# Patient Record
Sex: FEMALE | Race: WHITE | NOT HISPANIC OR LATINO | URBAN - METROPOLITAN AREA
[De-identification: names, ages, dates, MRNs, and addresses within clinical notes are randomized per-mention and may not be internally consistent; named-entity substitution may affect disease eponyms.]

---

## 2017-04-24 ENCOUNTER — OUTPATIENT (OUTPATIENT)
Dept: OUTPATIENT SERVICES | Facility: HOSPITAL | Age: 72
LOS: 1 days | End: 2017-04-24
Payer: COMMERCIAL

## 2017-04-24 VITALS
OXYGEN SATURATION: 100 % | HEART RATE: 81 BPM | SYSTOLIC BLOOD PRESSURE: 126 MMHG | DIASTOLIC BLOOD PRESSURE: 57 MMHG | RESPIRATION RATE: 18 BRPM | TEMPERATURE: 98 F

## 2017-04-24 DIAGNOSIS — Z01.810 ENCOUNTER FOR PREPROCEDURAL CARDIOVASCULAR EXAMINATION: ICD-10-CM

## 2017-04-24 DIAGNOSIS — I34.0 NONRHEUMATIC MITRAL (VALVE) INSUFFICIENCY: ICD-10-CM

## 2017-04-24 LAB
ANION GAP SERPL CALC-SCNC: 12 MMOL/L — SIGNIFICANT CHANGE UP (ref 5–17)
BASOPHILS # BLD AUTO: 0 K/UL — SIGNIFICANT CHANGE UP (ref 0–0.2)
BASOPHILS NFR BLD AUTO: 0.3 % — SIGNIFICANT CHANGE UP (ref 0–2)
BUN SERPL-MCNC: 16 MG/DL — SIGNIFICANT CHANGE UP (ref 8–20)
CALCIUM SERPL-MCNC: 9.2 MG/DL — SIGNIFICANT CHANGE UP (ref 8.6–10.2)
CHLORIDE SERPL-SCNC: 103 MMOL/L — SIGNIFICANT CHANGE UP (ref 98–107)
CO2 SERPL-SCNC: 25 MMOL/L — SIGNIFICANT CHANGE UP (ref 22–29)
CREAT SERPL-MCNC: 0.54 MG/DL — SIGNIFICANT CHANGE UP (ref 0.5–1.3)
EOSINOPHIL # BLD AUTO: 0.2 K/UL — SIGNIFICANT CHANGE UP (ref 0–0.5)
EOSINOPHIL NFR BLD AUTO: 2.4 % — SIGNIFICANT CHANGE UP (ref 0–6)
GLUCOSE SERPL-MCNC: 88 MG/DL — SIGNIFICANT CHANGE UP (ref 70–115)
HCT VFR BLD CALC: 40.8 % — SIGNIFICANT CHANGE UP (ref 37–47)
HGB BLD-MCNC: 13.4 G/DL — SIGNIFICANT CHANGE UP (ref 12–16)
INR BLD: 0.96 RATIO — SIGNIFICANT CHANGE UP (ref 0.88–1.16)
LYMPHOCYTES # BLD AUTO: 1.9 K/UL — SIGNIFICANT CHANGE UP (ref 1–4.8)
LYMPHOCYTES # BLD AUTO: 30.1 % — SIGNIFICANT CHANGE UP (ref 20–55)
MCHC RBC-ENTMCNC: 30 PG — SIGNIFICANT CHANGE UP (ref 27–31)
MCHC RBC-ENTMCNC: 32.8 G/DL — SIGNIFICANT CHANGE UP (ref 32–36)
MCV RBC AUTO: 91.5 FL — SIGNIFICANT CHANGE UP (ref 81–99)
MONOCYTES # BLD AUTO: 0.5 K/UL — SIGNIFICANT CHANGE UP (ref 0–0.8)
MONOCYTES NFR BLD AUTO: 7.9 % — SIGNIFICANT CHANGE UP (ref 3–10)
NEUTROPHILS # BLD AUTO: 3.7 K/UL — SIGNIFICANT CHANGE UP (ref 1.8–8)
NEUTROPHILS NFR BLD AUTO: 59 % — SIGNIFICANT CHANGE UP (ref 37–73)
PLATELET # BLD AUTO: 222 K/UL — SIGNIFICANT CHANGE UP (ref 150–400)
POTASSIUM SERPL-MCNC: 3.9 MMOL/L — SIGNIFICANT CHANGE UP (ref 3.5–5.3)
POTASSIUM SERPL-SCNC: 3.9 MMOL/L — SIGNIFICANT CHANGE UP (ref 3.5–5.3)
PROTHROM AB SERPL-ACNC: 10.5 SEC — SIGNIFICANT CHANGE UP (ref 9.8–12.7)
RBC # BLD: 4.46 M/UL — SIGNIFICANT CHANGE UP (ref 4.4–5.2)
RBC # FLD: 13.4 % — SIGNIFICANT CHANGE UP (ref 11–15.6)
SODIUM SERPL-SCNC: 140 MMOL/L — SIGNIFICANT CHANGE UP (ref 135–145)
WBC # BLD: 6.3 K/UL — SIGNIFICANT CHANGE UP (ref 4.8–10.8)
WBC # FLD AUTO: 6.3 K/UL — SIGNIFICANT CHANGE UP (ref 4.8–10.8)

## 2017-04-24 PROCEDURE — 93010 ELECTROCARDIOGRAM REPORT: CPT

## 2017-04-24 PROCEDURE — 80048 BASIC METABOLIC PNL TOTAL CA: CPT

## 2017-04-24 PROCEDURE — 93005 ELECTROCARDIOGRAM TRACING: CPT

## 2017-04-24 PROCEDURE — G0463: CPT

## 2017-04-24 PROCEDURE — 85610 PROTHROMBIN TIME: CPT

## 2017-04-24 PROCEDURE — 85027 COMPLETE CBC AUTOMATED: CPT

## 2017-04-24 NOTE — ASU PATIENT PROFILE, ADULT - PMH
Aortic valve insufficiency    Asthma    Cortical cataract of both eyes    Hyperlipidemia    Mitral regurgitation    Posterior vitreous detachment of both eyes    Psoriasis    Pulmonary hypertension

## 2017-04-24 NOTE — H&P PST ADULT - NEGATIVE CARDIOVASCULAR SYMPTOMS
no orthopnea/no peripheral edema/no palpitations/no claudication/no paroxysmal nocturnal dyspnea/no chest pain/no dyspnea on exertion

## 2017-04-24 NOTE — H&P PST ADULT - LAST ECHOCARDIOGRAM
April 2017 EF 65% with severe MR, mod AR and TR NL LV/RV function, mod Pulm HTN. Aortic valve leaflet not discussed

## 2017-04-24 NOTE — H&P PST ADULT - NEUROLOGICAL DETAILS
alert and oriented x 3/deep reflexes intact/cranial nerves intact/responds to verbal commands/responds to pain/sensation intact/normal strength

## 2017-04-24 NOTE — H&P PST ADULT - RS GEN PE MLT RESP DETAILS PC
good air movement/breath sounds equal/respirations non-labored/airway patent/no chest wall tenderness/normal/clear to auscultation bilaterally

## 2017-04-24 NOTE — H&P PST ADULT - HISTORY OF PRESENT ILLNESS
72 y/o thin white female presents to PST for LYN to evaluate recent finding of severe MR and moderate AS  Patient denies SOB, BECKER, dizziness, palpitations, syncope or near syncope, edema  Patient states new finding of MR/AR 72 y/o thin white female presents to PST for LYN to evaluate recent finding of severe MR and moderate AS  Patient denies SOB, BECKER, dizziness, palpitations, syncope or near syncope, edema  Patient states new finding of severe MR and moderate AR/TR and new murmur

## 2017-04-24 NOTE — H&P PST ADULT - NEGATIVE GENERAL SYMPTOMS
no weight loss/no polyphagia/no weight gain/no malaise/no chills/no fever/no polyuria/no fatigue/no anorexia/no polydipsia/no sweating

## 2017-04-24 NOTE — H&P PST ADULT - MUSCULOSKELETAL
details… detailed exam no joint swelling/no calf tenderness/normal/no joint warmth/normal strength/no joint erythema/ROM intact

## 2017-04-24 NOTE — H&P PST ADULT - ASSESSMENT
D/W patient and patient teaching LYN for evaluation of moderate AR, moderate TR and severe MR with gr iii/vi SM  NPO after midnight night prior to procedure  Escort for discharge

## 2017-04-24 NOTE — H&P PST ADULT - NSANTHOSAYNRD_GEN_A_CORE
No. PEDRO screening performed.  STOP BANG Legend: 0-2 = LOW Risk; 3-4 = INTERMEDIATE Risk; 5-8 = HIGH Risk

## 2017-04-24 NOTE — H&P PST ADULT - PMH
Aortic regurgitation  moderate  Asthma    Cortical cataract of both eyes    Hyperlipidemia  diet controlled  Mitral regurgitation  severe  Posterior vitreous detachment of both eyes    Psoriasis    Pulmonary hypertension  49-54 mmHG moderate PH  Systolic murmur    Tricuspid regurgitation  moderate

## 2017-04-24 NOTE — H&P PST ADULT - NEGATIVE NEUROLOGICAL SYMPTOMS
no paresthesias/no weakness/no generalized seizures/no focal seizures/no loss of consciousness/no syncope/no tremors/no transient paralysis/no difficulty walking

## 2017-04-25 DIAGNOSIS — Z01.810 ENCOUNTER FOR PREPROCEDURAL CARDIOVASCULAR EXAMINATION: ICD-10-CM

## 2017-04-25 DIAGNOSIS — I25.10 ATHEROSCLEROTIC HEART DISEASE OF NATIVE CORONARY ARTERY WITHOUT ANGINA PECTORIS: ICD-10-CM

## 2017-04-27 ENCOUNTER — OUTPATIENT (OUTPATIENT)
Dept: OUTPATIENT SERVICES | Facility: HOSPITAL | Age: 72
LOS: 1 days | End: 2017-04-27
Payer: COMMERCIAL

## 2017-04-27 VITALS
TEMPERATURE: 98 F | DIASTOLIC BLOOD PRESSURE: 79 MMHG | SYSTOLIC BLOOD PRESSURE: 140 MMHG | RESPIRATION RATE: 16 BRPM | HEART RATE: 72 BPM | OXYGEN SATURATION: 99 %

## 2017-04-27 VITALS
HEART RATE: 71 BPM | SYSTOLIC BLOOD PRESSURE: 118 MMHG | OXYGEN SATURATION: 98 % | DIASTOLIC BLOOD PRESSURE: 56 MMHG | RESPIRATION RATE: 16 BRPM

## 2017-04-27 DIAGNOSIS — I34.0 NONRHEUMATIC MITRAL (VALVE) INSUFFICIENCY: ICD-10-CM

## 2017-04-27 DIAGNOSIS — Z01.810 ENCOUNTER FOR PREPROCEDURAL CARDIOVASCULAR EXAMINATION: ICD-10-CM

## 2017-04-27 PROCEDURE — 93320 DOPPLER ECHO COMPLETE: CPT | Mod: 26

## 2017-04-27 PROCEDURE — 93312 ECHO TRANSESOPHAGEAL: CPT

## 2017-04-27 PROCEDURE — 93325 DOPPLER ECHO COLOR FLOW MAPG: CPT | Mod: 26

## 2017-04-27 PROCEDURE — 76376 3D RENDER W/INTRP POSTPROCES: CPT | Mod: 26

## 2017-04-27 PROCEDURE — 93320 DOPPLER ECHO COMPLETE: CPT

## 2017-04-27 PROCEDURE — 93325 DOPPLER ECHO COLOR FLOW MAPG: CPT

## 2017-04-27 PROCEDURE — 93312 ECHO TRANSESOPHAGEAL: CPT | Mod: 26

## 2017-04-27 RX ORDER — SODIUM CHLORIDE 9 MG/ML
1000 INJECTION INTRAMUSCULAR; INTRAVENOUS; SUBCUTANEOUS
Qty: 0 | Refills: 0 | Status: DISCONTINUED | OUTPATIENT
Start: 2017-04-27 | End: 2017-05-12

## 2017-04-27 NOTE — DISCHARGE NOTE ADULT - PLAN OF CARE
ADL without SOB Follow up 7-10 days as outpatient  Do not eat until 11 am  Do not operate heavy machinery today

## 2017-04-27 NOTE — DISCHARGE NOTE ADULT - HOSPITAL COURSE
Elective LYN to assess MR.  Severe MR and PFO.  Plan to follow up with cardiologist for further medical management.  D/C when tolerating po fluids.

## 2017-04-27 NOTE — DISCHARGE NOTE ADULT - CARE PLAN
Principal Discharge DX:	Mitral regurgitation  Goal:	ADL without SOB  Instructions for follow-up, activity and diet:	Follow up 7-10 days as outpatient  Do not eat until 11 am  Do not operate heavy machinery today

## 2017-04-27 NOTE — DISCHARGE NOTE ADULT - CARE PROVIDER_API CALL
Ryan Schuler), Cardiovascular Disease  [address] [place] [address]  Phone: [phone]  Fax: [phone]

## 2017-04-27 NOTE — DISCHARGE NOTE ADULT - MEDICATION SUMMARY - MEDICATIONS TO TAKE
I will START or STAY ON the medications listed below when I get home from the hospital:    Vitamin D3  --  by mouth once a day  -- Indication: For Supplement

## 2017-04-27 NOTE — PROGRESS NOTE ADULT - SUBJECTIVE AND OBJECTIVE BOX
Procedure Note    LYN    Informed consent obtained.  Anesthesia provided sedation.  Probe passed w/o difficulty.  Pt tolerated procedure well.  No complication noted.

## 2017-04-27 NOTE — DISCHARGE NOTE ADULT - PATIENT PORTAL LINK FT
“You can access the FollowHealth Patient Portal, offered by [facility], by registering with the following website: [email or web address]”

## 2017-04-27 NOTE — DISCHARGE NOTE ADULT - CARE PROVIDERS DIRECT ADDRESSES
[email or web address]

## 2017-04-28 DIAGNOSIS — I34.0 NONRHEUMATIC MITRAL (VALVE) INSUFFICIENCY: ICD-10-CM

## 2017-05-01 PROBLEM — I34.0 NONRHEUMATIC MITRAL (VALVE) INSUFFICIENCY: Chronic | Status: ACTIVE | Noted: 2017-04-24

## 2017-05-02 PROBLEM — Z00.00 ENCOUNTER FOR PREVENTIVE HEALTH EXAMINATION: Status: ACTIVE | Noted: 2017-05-02

## 2017-05-10 ENCOUNTER — RECORD ABSTRACTING (OUTPATIENT)
Age: 72
End: 2017-05-10

## 2017-05-10 DIAGNOSIS — Z87.09 PERSONAL HISTORY OF OTHER DISEASES OF THE RESPIRATORY SYSTEM: ICD-10-CM

## 2017-05-10 DIAGNOSIS — Q21.1 ATRIAL SEPTAL DEFECT: ICD-10-CM

## 2017-05-10 DIAGNOSIS — Z86.39 PERSONAL HISTORY OF OTHER ENDOCRINE, NUTRITIONAL AND METABOLIC DISEASE: ICD-10-CM

## 2017-05-10 DIAGNOSIS — I34.0 NONRHEUMATIC MITRAL (VALVE) INSUFFICIENCY: ICD-10-CM

## 2017-05-10 RX ORDER — MONTELUKAST SODIUM 10 MG/1
TABLET, FILM COATED ORAL
Refills: 0 | Status: ACTIVE | COMMUNITY

## 2017-05-11 ENCOUNTER — APPOINTMENT (OUTPATIENT)
Dept: CARDIOTHORACIC SURGERY | Facility: CLINIC | Age: 72
End: 2017-05-11
Payer: MEDICARE

## 2017-05-11 VITALS
OXYGEN SATURATION: 98 % | DIASTOLIC BLOOD PRESSURE: 84 MMHG | BODY MASS INDEX: 21.71 KG/M2 | RESPIRATION RATE: 16 BRPM | HEART RATE: 87 BPM | SYSTOLIC BLOOD PRESSURE: 135 MMHG | HEIGHT: 62 IN | WEIGHT: 118 LBS

## 2017-05-11 PROCEDURE — 99205 OFFICE O/P NEW HI 60 MIN: CPT

## 2017-05-22 ENCOUNTER — APPOINTMENT (OUTPATIENT)
Dept: PULMONOLOGY | Facility: CLINIC | Age: 72
End: 2017-05-22
Payer: MEDICARE

## 2017-05-22 ENCOUNTER — OUTPATIENT (OUTPATIENT)
Dept: OUTPATIENT SERVICES | Facility: HOSPITAL | Age: 72
LOS: 1 days | End: 2017-05-22
Payer: COMMERCIAL

## 2017-05-22 VITALS
SYSTOLIC BLOOD PRESSURE: 125 MMHG | DIASTOLIC BLOOD PRESSURE: 74 MMHG | HEART RATE: 87 BPM | TEMPERATURE: 98 F | WEIGHT: 119.05 LBS | RESPIRATION RATE: 16 BRPM | HEIGHT: 60 IN

## 2017-05-22 DIAGNOSIS — Z01.810 ENCOUNTER FOR PREPROCEDURAL CARDIOVASCULAR EXAMINATION: ICD-10-CM

## 2017-05-22 DIAGNOSIS — I34.0 NONRHEUMATIC MITRAL (VALVE) INSUFFICIENCY: ICD-10-CM

## 2017-05-22 DIAGNOSIS — R06.09 OTHER FORMS OF DYSPNEA: ICD-10-CM

## 2017-05-22 DIAGNOSIS — J45.998 OTHER ASTHMA: ICD-10-CM

## 2017-05-22 LAB
ALBUMIN SERPL ELPH-MCNC: 4.3 G/DL — SIGNIFICANT CHANGE UP (ref 3.3–5.2)
ALP SERPL-CCNC: 58 U/L — SIGNIFICANT CHANGE UP (ref 40–120)
ALT FLD-CCNC: 15 U/L — SIGNIFICANT CHANGE UP
ANION GAP SERPL CALC-SCNC: 13 MMOL/L — SIGNIFICANT CHANGE UP (ref 5–17)
APPEARANCE UR: CLEAR — SIGNIFICANT CHANGE UP
APTT BLD: 30.4 SEC — SIGNIFICANT CHANGE UP (ref 27.5–37.4)
AST SERPL-CCNC: 15 U/L — SIGNIFICANT CHANGE UP
BASOPHILS # BLD AUTO: 0 K/UL — SIGNIFICANT CHANGE UP (ref 0–0.2)
BASOPHILS NFR BLD AUTO: 0.3 % — SIGNIFICANT CHANGE UP (ref 0–2)
BILIRUB SERPL-MCNC: 0.3 MG/DL — LOW (ref 0.4–2)
BILIRUB UR-MCNC: NEGATIVE — SIGNIFICANT CHANGE UP
BLD GP AB SCN SERPL QL: SIGNIFICANT CHANGE UP
BUN SERPL-MCNC: 15 MG/DL — SIGNIFICANT CHANGE UP (ref 8–20)
CALCIUM SERPL-MCNC: 9.7 MG/DL — SIGNIFICANT CHANGE UP (ref 8.6–10.2)
CHLORIDE SERPL-SCNC: 99 MMOL/L — SIGNIFICANT CHANGE UP (ref 98–107)
CO2 SERPL-SCNC: 28 MMOL/L — SIGNIFICANT CHANGE UP (ref 22–29)
COLOR SPEC: YELLOW — SIGNIFICANT CHANGE UP
CREAT SERPL-MCNC: 0.56 MG/DL — SIGNIFICANT CHANGE UP (ref 0.5–1.3)
DIFF PNL FLD: ABNORMAL
EOSINOPHIL # BLD AUTO: 0.2 K/UL — SIGNIFICANT CHANGE UP (ref 0–0.5)
EOSINOPHIL NFR BLD AUTO: 2.2 % — SIGNIFICANT CHANGE UP (ref 0–6)
GLUCOSE SERPL-MCNC: 102 MG/DL — SIGNIFICANT CHANGE UP (ref 70–115)
GLUCOSE UR QL: NEGATIVE MG/DL — SIGNIFICANT CHANGE UP
HBA1C BLD-MCNC: 5.7 % — HIGH (ref 4–5.6)
HCT VFR BLD CALC: 42.9 % — SIGNIFICANT CHANGE UP (ref 37–47)
HGB BLD-MCNC: 14.3 G/DL — SIGNIFICANT CHANGE UP (ref 12–16)
INR BLD: 0.92 RATIO — SIGNIFICANT CHANGE UP (ref 0.88–1.16)
KETONES UR-MCNC: NEGATIVE — SIGNIFICANT CHANGE UP
LEUKOCYTE ESTERASE UR-ACNC: NEGATIVE — SIGNIFICANT CHANGE UP
LYMPHOCYTES # BLD AUTO: 1.8 K/UL — SIGNIFICANT CHANGE UP (ref 1–4.8)
LYMPHOCYTES # BLD AUTO: 25.6 % — SIGNIFICANT CHANGE UP (ref 20–55)
MCHC RBC-ENTMCNC: 30.4 PG — SIGNIFICANT CHANGE UP (ref 27–31)
MCHC RBC-ENTMCNC: 33.3 G/DL — SIGNIFICANT CHANGE UP (ref 32–36)
MCV RBC AUTO: 91.3 FL — SIGNIFICANT CHANGE UP (ref 81–99)
MONOCYTES # BLD AUTO: 0.5 K/UL — SIGNIFICANT CHANGE UP (ref 0–0.8)
MONOCYTES NFR BLD AUTO: 6.8 % — SIGNIFICANT CHANGE UP (ref 3–10)
MRSA PCR RESULT.: SIGNIFICANT CHANGE UP
NEUTROPHILS # BLD AUTO: 4.5 K/UL — SIGNIFICANT CHANGE UP (ref 1.8–8)
NEUTROPHILS NFR BLD AUTO: 64.7 % — SIGNIFICANT CHANGE UP (ref 37–73)
NITRITE UR-MCNC: NEGATIVE — SIGNIFICANT CHANGE UP
NT-PROBNP SERPL-SCNC: 119 PG/ML — SIGNIFICANT CHANGE UP (ref 0–300)
PH UR: 7 — SIGNIFICANT CHANGE UP (ref 5–8)
PLATELET # BLD AUTO: 249 K/UL — SIGNIFICANT CHANGE UP (ref 150–400)
POTASSIUM SERPL-MCNC: 4 MMOL/L — SIGNIFICANT CHANGE UP (ref 3.5–5.3)
POTASSIUM SERPL-SCNC: 4 MMOL/L — SIGNIFICANT CHANGE UP (ref 3.5–5.3)
PREALB SERPL-MCNC: 30 MG/DL — SIGNIFICANT CHANGE UP (ref 18–38)
PROT SERPL-MCNC: 7.3 G/DL — SIGNIFICANT CHANGE UP (ref 6.6–8.7)
PROT UR-MCNC: NEGATIVE MG/DL — SIGNIFICANT CHANGE UP
PROTHROM AB SERPL-ACNC: 10.1 SEC — SIGNIFICANT CHANGE UP (ref 9.8–12.7)
RBC # BLD: 4.7 M/UL — SIGNIFICANT CHANGE UP (ref 4.4–5.2)
RBC # FLD: 13.5 % — SIGNIFICANT CHANGE UP (ref 11–15.6)
RBC CASTS # UR COMP ASSIST: SIGNIFICANT CHANGE UP /HPF (ref 0–4)
S AUREUS DNA NOSE QL NAA+PROBE: SIGNIFICANT CHANGE UP
SODIUM SERPL-SCNC: 140 MMOL/L — SIGNIFICANT CHANGE UP (ref 135–145)
SP GR SPEC: 1 — LOW (ref 1.01–1.02)
T3 SERPL-MCNC: 116 NG/DL — SIGNIFICANT CHANGE UP (ref 80–200)
T4 AB SER-ACNC: 7 UG/DL — SIGNIFICANT CHANGE UP (ref 4.5–12)
TSH SERPL-MCNC: 0.9 UIU/ML — SIGNIFICANT CHANGE UP (ref 0.27–4.2)
TYPE + AB SCN PNL BLD: SIGNIFICANT CHANGE UP
UROBILINOGEN FLD QL: NEGATIVE MG/DL — SIGNIFICANT CHANGE UP
WBC # BLD: 6.9 K/UL — SIGNIFICANT CHANGE UP (ref 4.8–10.8)
WBC # FLD AUTO: 6.9 K/UL — SIGNIFICANT CHANGE UP (ref 4.8–10.8)

## 2017-05-22 PROCEDURE — 94664 DEMO&/EVAL PT USE INHALER: CPT | Mod: 59

## 2017-05-22 PROCEDURE — 94727 GAS DIL/WSHOT DETER LNG VOL: CPT

## 2017-05-22 PROCEDURE — 71020: CPT | Mod: 26

## 2017-05-22 PROCEDURE — 85018 HEMOGLOBIN: CPT | Mod: QW

## 2017-05-22 PROCEDURE — 93880 EXTRACRANIAL BILAT STUDY: CPT

## 2017-05-22 PROCEDURE — 94729 DIFFUSING CAPACITY: CPT

## 2017-05-22 PROCEDURE — 93010 ELECTROCARDIOGRAM REPORT: CPT

## 2017-05-22 PROCEDURE — 93880 EXTRACRANIAL BILAT STUDY: CPT | Mod: 26

## 2017-05-22 PROCEDURE — 94060 EVALUATION OF WHEEZING: CPT

## 2017-05-22 RX ORDER — CHOLECALCIFEROL (VITAMIN D3) 125 MCG
0 CAPSULE ORAL
Qty: 0 | Refills: 0 | COMMUNITY

## 2017-05-22 RX ORDER — CEFUROXIME AXETIL 250 MG
1500 TABLET ORAL ONCE
Qty: 0 | Refills: 0 | Status: DISCONTINUED | OUTPATIENT
Start: 2017-06-12 | End: 2017-06-12

## 2017-05-22 NOTE — H&P PST ADULT - PMH
Aortic regurgitation  moderate  Asthma    Hyperlipidemia  diet controlled  Mitral regurgitation  severe  Systolic murmur    Tricuspid regurgitation  moderate

## 2017-05-22 NOTE — H&P PST ADULT - HISTORY OF PRESENT ILLNESS
70 y/o thin white female presents to PST fo PST for a mitral valve repair possible replacement and CABG based on a recent finding of severe MR and moderate AS. Patient denies SOB, BECKER, dizziness, palpitations, syncope or near syncope, edema. She is scheduled for  cardiac cath on 6/5.

## 2017-05-23 ENCOUNTER — RECORD ABSTRACTING (OUTPATIENT)
Age: 72
End: 2017-05-23

## 2017-05-23 LAB
CULTURE RESULTS: NO GROWTH — SIGNIFICANT CHANGE UP
SPECIMEN SOURCE: SIGNIFICANT CHANGE UP

## 2017-06-05 ENCOUNTER — OUTPATIENT (OUTPATIENT)
Dept: OUTPATIENT SERVICES | Facility: HOSPITAL | Age: 72
LOS: 1 days | Discharge: HOME | End: 2017-06-05
Payer: COMMERCIAL

## 2017-06-05 VITALS
RESPIRATION RATE: 17 BRPM | DIASTOLIC BLOOD PRESSURE: 66 MMHG | TEMPERATURE: 98 F | HEART RATE: 81 BPM | OXYGEN SATURATION: 99 % | SYSTOLIC BLOOD PRESSURE: 121 MMHG

## 2017-06-05 VITALS
SYSTOLIC BLOOD PRESSURE: 112 MMHG | HEART RATE: 73 BPM | OXYGEN SATURATION: 97 % | RESPIRATION RATE: 18 BRPM | DIASTOLIC BLOOD PRESSURE: 56 MMHG

## 2017-06-05 DIAGNOSIS — I34.0 NONRHEUMATIC MITRAL (VALVE) INSUFFICIENCY: ICD-10-CM

## 2017-06-05 DIAGNOSIS — Z90.89 ACQUIRED ABSENCE OF OTHER ORGANS: Chronic | ICD-10-CM

## 2017-06-05 DIAGNOSIS — J45.20 MILD INTERMITTENT ASTHMA, UNCOMPLICATED: ICD-10-CM

## 2017-06-05 PROCEDURE — 93454 CORONARY ARTERY ANGIO S&I: CPT

## 2017-06-05 PROCEDURE — C1769: CPT

## 2017-06-05 PROCEDURE — C1894: CPT

## 2017-06-05 PROCEDURE — C1887: CPT

## 2017-06-05 PROCEDURE — 93454 CORONARY ARTERY ANGIO S&I: CPT | Mod: 26

## 2017-06-05 NOTE — H&P PST ADULT - HISTORY OF PRESENT ILLNESS
72 y/o thin white female presents for a mitral valve repair possible replacement and CABG based on a recent finding of severe MR and moderate AS. Patient denies SOB, BECKER, dizziness, palpitations, syncope or near syncope, edema. She is scheduled for  cardiac cath on 6/5. 72 y/o thin white female nonsmoker with history of HLD, reactive airway disease, MR, and a small PFO who is scheduled for a mitral valve repair/possible replacement and possible CABG.  She started seeing a new PMD who auscultated a significant murmur and ordered an echo which showed severe MR and moderate AR.  She had a LYN which showed severe MR with myxomatous degenerative changes; flail middle scallop (P2) of the posterior mitral valve leaflet noted with ruptured chordae, color flow showing severe eccentric jet of mitral regurgitation.  Patient denies SOB, BECKER, dizziness, palpitations, syncope or near syncope, edema. She is here for a preoperative cardiac catheterization to evaluate her coronary anatomy.  She had a carotid doppler which showed no significant stenosis.    LYN: April 27, 2017  Left Ventricle: The left ventricular internal cavity size is normal. Left   ventricular wall thickness is normal.  Global LV systolic function was normal. Left ventricular ejection   fraction, by visual estimation, is 55 to 60%.  Right Ventricle: Normal right ventricular size and function.  Left Atrium: The left atrium is normal in size. Color Doppler   demonstrates the presence of a shunt at the Atrial level.  Right Atrium: The right atrium is normal in size.  Pericardium: There is no evidence of pericardial effusion.  Mitral Valve: The mitral valve is myxomatous. Severe mitral valve   regurgitation is seen. Mitral valve with myxomatous degenerative changes;   flail middle scallop (P2) of the posterior mitral valve leaflet noted   with ruptured chordae, color flow showing severe eccentric jet of mitral   regurgitation.  Tricuspid Valve: Structurally normal tricuspid valve, with normal leaflet   excursion. Mild tricuspid regurgitation is visualized.  Aortic Valve: Normal trileaflet aortic valve with normal opening.  Pulmonic Valve: Structurally normal pulmonic valve, with normal leaflet   excursion. Mild pulmonic valve regurgitation.  Shunts: A small patent foramen ovale is detected, with predominantly left   to right shunting across the atrial septum.

## 2017-06-05 NOTE — DISCHARGE NOTE ADULT - CARE PROVIDERS DIRECT ADDRESSES
[email or web address]

## 2017-06-05 NOTE — DISCHARGE NOTE ADULT - CARE PLAN
Principal Discharge DX:	Non-rheumatic mitral regurgitation  Goal:	Normal cardiac function  Instructions for follow-up, activity and diet:	Follow up with your doctor as instructed.  Continue medications as instructed.  Follow prescribed diet.

## 2017-06-05 NOTE — PROGRESS NOTE ADULT - SUBJECTIVE AND OBJECTIVE BOX
Subjective:  71y  Female who had a left heart catheterization which showed:       LM: Normal       LAD: Normal       LCX: Normal       RCA: Normal    PAST MEDICAL & SURGICAL HISTORY:  Pure hypercholesterolemia  Mild intermittent reactive airway disease without complication  Systolic murmur  Aortic regurgitation: moderate  Tricuspid regurgitation: moderate  Aortic valve insufficiency  Mitral regurgitation: severe  Pulmonary hypertension: 49-54 mmHG moderate PH  Cortical cataract of both eyes  Posterior vitreous detachment of both eyes  Psoriasis  Hyperlipidemia: diet controlled  Asthma  History of tonsillectomy  No significant past surgical history    FAMILY HISTORY:  No pertinent family history in first degree relatives      Patient is a 71y old  Female who presents with a chief complaint of "Mitral valve repair" (05 Jun 2017 08:23)    HEALTH ISSUES - PROBLEM Dx:  Non-rheumatic mitral regurgitation  Mild intermittent reactive airway disease without complication  Mitral regurgitation        HPI:  72 y/o thin white female nonsmoker with history of HLD, reactive airway disease, MR, and a small PFO who is scheduled for a mitral valve repair/possible replacement and possible CABG.  She started seeing a new PMD who auscultated a significant murmur and ordered an echo which showed severe MR and moderate AR.  She had a LYN which showed severe MR with myxomatous degenerative changes; flail middle scallop (P2) of the posterior mitral valve leaflet noted with ruptured chordae, color flow showing severe eccentric jet of mitral regurgitation.  Patient denies SOB, BECKER, dizziness, palpitations, syncope or near syncope, edema. She is here for a preoperative cardiac catheterization to evaluate her coronary anatomy.  She had a carotid doppler which showed no significant stenosis.    LYN: April 27, 2017  Left Ventricle: The left ventricular internal cavity size is normal. Left   ventricular wall thickness is normal.  Global LV systolic function was normal. Left ventricular ejection   fraction, by visual estimation, is 55 to 60%.  Right Ventricle: Normal right ventricular size and function.  Left Atrium: The left atrium is normal in size. Color Doppler   demonstrates the presence of a shunt at the Atrial level.  Right Atrium: The right atrium is normal in size.  Pericardium: There is no evidence of pericardial effusion.  Mitral Valve: The mitral valve is myxomatous. Severe mitral valve   regurgitation is seen. Mitral valve with myxomatous degenerative changes;   flail middle scallop (P2) of the posterior mitral valve leaflet noted   with ruptured chordae, color flow showing severe eccentric jet of mitral   regurgitation.  Tricuspid Valve: Structurally normal tricuspid valve, with normal leaflet   excursion. Mild tricuspid regurgitation is visualized.  Aortic Valve: Normal trileaflet aortic valve with normal opening.  Pulmonic Valve: Structurally normal pulmonic valve, with normal leaflet   excursion. Mild pulmonic valve regurgitation.  Shunts: A small patent foramen ovale is detected, with predominantly left   to right shunting across the atrial septum. (05 Jun 2017 07:15)    General: No fatigue, no fevers/chills  Respiratory: No dyspnea, no cough, no wheeze  CV: No chest pain, no palpitations  Abd: No nausea  Neuro: No headache, no dizziness    No Known Allergies      Objective:  Vital Signs Last 24 Hrs  T(C): 36.7, Max: 36.7 (06-05 @ 07:06)  T(F): 98.1, Max: 98.1 (06-05 @ 07:06)  HR: 81 (81 - 81)  BP: 121/66 (121/66 - 121/66)  RR: 17 (17 - 17)  SpO2: 99% (99% - 99%)    CM: SR  Neuro: A&OX3, CN 2-12 intact  HEENT: NC, AT  Lungs: CTA B/L  CV: S1, S2, no murmur, RRR  Abd: Soft  Extremity: Right radial band: no bleeding, fingers warm with good cap refil

## 2017-06-05 NOTE — DISCHARGE NOTE ADULT - PLAN OF CARE
Normal cardiac function Follow up with your doctor as instructed.  Continue medications as instructed.  Follow prescribed diet.

## 2017-06-05 NOTE — DISCHARGE NOTE ADULT - MEDICATION SUMMARY - MEDICATIONS TO TAKE
I will START or STAY ON the medications listed below when I get home from the hospital:    Calcium 600+D oral tablet  -- 2 tab(s) by mouth once a day  -- Indication: For Supplement

## 2017-06-05 NOTE — DISCHARGE NOTE ADULT - HOSPITAL COURSE
72 y/o thin white female nonsmoker with history of HLD, reactive airway disease, MR, and a small PFO who is scheduled for a mitral valve repair/possible replacement and possible CABG.  She started seeing a new PMD who auscultated a significant murmur and ordered an echo which showed severe MR and moderate AR.  She had a LYN which showed severe MR with myxomatous degenerative changes; flail middle scallop (P2) of the posterior mitral valve leaflet noted with ruptured chordae, color flow showing severe eccentric jet of mitral regurgitation.  Patient denies SOB, BECKER, dizziness, palpitations, syncope or near syncope, edema. She is here for a preoperative cardiac catheterization to evaluate her coronary anatomy.  She had a carotid doppler which showed no significant stenosis.  She had a LHC which showed:   LHC:       LM: Normal       LAD: Normal       LCX: Normal       RCA: Normal    Plan:   ·	MV repair/replacement on Monday June12, 2017.  ·	Continue current medications

## 2017-06-05 NOTE — H&P PST ADULT - ASSESSMENT
72 y/o thin white female nonsmoker with history of HLD, reactive airway disease, MR, and a small PFO who is scheduled for a mitral valve repair/possible replacement and possible CABG.  She started seeing a new PMD who auscultated a significant murmur and ordered an echo which showed severe MR and moderate AR.  She had a LYN which showed severe MR with myxomatous degenerative changes; flail middle scallop (P2) of the posterior mitral valve leaflet noted with ruptured chordae, color flow showing severe eccentric jet of mitral regurgitation.  Patient denies SOB, BECKER, dizziness, palpitations, syncope or near syncope, edema. She is here for a preoperative cardiac catheterization to evaluate her coronary anatomy.  She had a carotid doppler which showed no significant stenosis.

## 2017-06-05 NOTE — DISCHARGE NOTE ADULT - NS AS ACTIVITY OBS
Do not make important decisions/Do not drive or operate machinery/Showering allowed/Walking-Indoors allowed/Walking-Outdoors allowed/Stairs allowed/No Heavy lifting/straining

## 2017-06-05 NOTE — DISCHARGE NOTE ADULT - PATIENT PORTAL LINK FT
“You can access the FollowHealth Patient Portal, offered by [facility], by registering with the following website: [email or web address]”

## 2017-06-05 NOTE — PROGRESS NOTE ADULT - ASSESSMENT
71y Female   Procedure: Left heart catheterization  Pre-op diagnosis: Severe MR  Post-op diagnosis: Severe MR with normal coronaries    1. Remove radial band at: 10:15AM    2. If OK, discharge home at: 11:15AM    3. Follow up as an outpatient with: Dr. Champagne    4. Continue current medications

## 2017-06-05 NOTE — H&P PST ADULT - EKG AND INTERPRETATION
EKG reviewed, no acute changes, official reading pending. SR with sinus arrhythmia, LAFB, and nonspecific T wave abnormalities.

## 2017-06-05 NOTE — H&P PST ADULT - PROBLEM SELECTOR PLAN 1
Patient scheduled for mitral valve repair/replacement with Dr. Champagne, and is having preoperative LHC.

## 2017-06-05 NOTE — DISCHARGE NOTE ADULT - CARE PROVIDER_API CALL
David Champagne), Surgery; Thoracic and Cardiac Surgery  [address] [place] [address]  Phone: [phone]  Fax: [phone]    Ryan Schuler), Cardiovascular Disease  [address] [place] [address]  Phone: [phone]  Fax: [phone]

## 2017-06-12 ENCOUNTER — APPOINTMENT (OUTPATIENT)
Dept: CARDIOTHORACIC SURGERY | Facility: HOSPITAL | Age: 72
End: 2017-06-12
Payer: COMMERCIAL

## 2017-06-12 ENCOUNTER — RESULT REVIEW (OUTPATIENT)
Age: 72
End: 2017-06-12

## 2017-06-12 ENCOUNTER — INPATIENT (INPATIENT)
Facility: HOSPITAL | Age: 72
LOS: 4 days | Discharge: HOME | DRG: 220 | End: 2017-06-17
Attending: THORACIC SURGERY (CARDIOTHORACIC VASCULAR SURGERY) | Admitting: THORACIC SURGERY (CARDIOTHORACIC VASCULAR SURGERY)
Payer: COMMERCIAL

## 2017-06-12 VITALS
OXYGEN SATURATION: 100 % | RESPIRATION RATE: 16 BRPM | DIASTOLIC BLOOD PRESSURE: 50 MMHG | TEMPERATURE: 97 F | HEIGHT: 60 IN | WEIGHT: 119.05 LBS | SYSTOLIC BLOOD PRESSURE: 114 MMHG | HEART RATE: 75 BPM

## 2017-06-12 DIAGNOSIS — I34.0 NONRHEUMATIC MITRAL (VALVE) INSUFFICIENCY: ICD-10-CM

## 2017-06-12 DIAGNOSIS — Z90.89 ACQUIRED ABSENCE OF OTHER ORGANS: Chronic | ICD-10-CM

## 2017-06-12 LAB
ALBUMIN SERPL ELPH-MCNC: 2.6 G/DL — LOW (ref 3.3–5.2)
ALP SERPL-CCNC: 26 U/L — LOW (ref 40–120)
ALT FLD-CCNC: 25 U/L — SIGNIFICANT CHANGE UP
ANION GAP SERPL CALC-SCNC: 13 MMOL/L — SIGNIFICANT CHANGE UP (ref 5–17)
APTT BLD: 31.1 SEC — SIGNIFICANT CHANGE UP (ref 27.5–37.4)
AST SERPL-CCNC: 58 U/L — HIGH
BILIRUB DIRECT SERPL-MCNC: 0.1 MG/DL — SIGNIFICANT CHANGE UP (ref 0–0.3)
BILIRUB INDIRECT FLD-MCNC: 0.7 MG/DL — SIGNIFICANT CHANGE UP (ref 0.2–1)
BILIRUB SERPL-MCNC: 0.8 MG/DL — SIGNIFICANT CHANGE UP (ref 0.4–2)
BLD GP AB SCN SERPL QL: SIGNIFICANT CHANGE UP
BUN SERPL-MCNC: 16 MG/DL — SIGNIFICANT CHANGE UP (ref 8–20)
CALCIUM SERPL-MCNC: 7.5 MG/DL — LOW (ref 8.6–10.2)
CHLORIDE SERPL-SCNC: 110 MMOL/L — HIGH (ref 98–107)
CK MB CFR SERPL CALC: 44.9 NG/ML — HIGH (ref 0–6.7)
CK SERPL-CCNC: 268 U/L — HIGH (ref 25–170)
CO2 SERPL-SCNC: 19 MMOL/L — LOW (ref 22–29)
CREAT SERPL-MCNC: 0.45 MG/DL — LOW (ref 0.5–1.3)
GAS PNL BLDA: SIGNIFICANT CHANGE UP
GLUCOSE SERPL-MCNC: 153 MG/DL — HIGH (ref 70–115)
HCT VFR BLD CALC: 35.6 % — LOW (ref 37–47)
HGB BLD-MCNC: 11.8 G/DL — LOW (ref 12–16)
INR BLD: 1.37 RATIO — HIGH (ref 0.88–1.16)
MAGNESIUM SERPL-MCNC: 2.8 MG/DL — HIGH (ref 1.6–2.6)
MCHC RBC-ENTMCNC: 29.9 PG — SIGNIFICANT CHANGE UP (ref 27–31)
MCHC RBC-ENTMCNC: 33.1 G/DL — SIGNIFICANT CHANGE UP (ref 32–36)
MCV RBC AUTO: 90.1 FL — SIGNIFICANT CHANGE UP (ref 81–99)
PHOSPHATE SERPL-MCNC: 2.3 MG/DL — LOW (ref 2.4–4.7)
PLATELET # BLD AUTO: 90 K/UL — LOW (ref 150–400)
POTASSIUM SERPL-MCNC: 3.9 MMOL/L — SIGNIFICANT CHANGE UP (ref 3.5–5.3)
POTASSIUM SERPL-SCNC: 3.9 MMOL/L — SIGNIFICANT CHANGE UP (ref 3.5–5.3)
PROT SERPL-MCNC: 3.9 G/DL — LOW (ref 6.6–8.7)
PROTHROM AB SERPL-ACNC: 15.2 SEC — HIGH (ref 9.8–12.7)
RBC # BLD: 3.95 M/UL — LOW (ref 4.4–5.2)
RBC # FLD: 13.6 % — SIGNIFICANT CHANGE UP (ref 11–15.6)
SODIUM SERPL-SCNC: 142 MMOL/L — SIGNIFICANT CHANGE UP (ref 135–145)
TROPONIN T SERPL-MCNC: 0.64 NG/ML — HIGH (ref 0–0.06)
TYPE + AB SCN PNL BLD: SIGNIFICANT CHANGE UP
WBC # BLD: 17.4 K/UL — HIGH (ref 4.8–10.8)
WBC # FLD AUTO: 17.4 K/UL — HIGH (ref 4.8–10.8)

## 2017-06-12 PROCEDURE — 87086 URINE CULTURE/COLONY COUNT: CPT

## 2017-06-12 PROCEDURE — 84134 ASSAY OF PREALBUMIN: CPT

## 2017-06-12 PROCEDURE — 93355 ECHO TRANSESOPHAGEAL (TEE): CPT

## 2017-06-12 PROCEDURE — 88305 TISSUE EXAM BY PATHOLOGIST: CPT | Mod: 26

## 2017-06-12 PROCEDURE — 33427 REPAIR OF MITRAL VALVE: CPT | Mod: AS

## 2017-06-12 PROCEDURE — 86900 BLOOD TYPING SEROLOGIC ABO: CPT

## 2017-06-12 PROCEDURE — 86850 RBC ANTIBODY SCREEN: CPT

## 2017-06-12 PROCEDURE — 87641 MR-STAPH DNA AMP PROBE: CPT

## 2017-06-12 PROCEDURE — 86901 BLOOD TYPING SEROLOGIC RH(D): CPT

## 2017-06-12 PROCEDURE — 86920 COMPATIBILITY TEST SPIN: CPT

## 2017-06-12 PROCEDURE — G0463: CPT

## 2017-06-12 PROCEDURE — 85730 THROMBOPLASTIN TIME PARTIAL: CPT

## 2017-06-12 PROCEDURE — 84443 ASSAY THYROID STIM HORMONE: CPT

## 2017-06-12 PROCEDURE — 83880 ASSAY OF NATRIURETIC PEPTIDE: CPT

## 2017-06-12 PROCEDURE — 87640 STAPH A DNA AMP PROBE: CPT

## 2017-06-12 PROCEDURE — 71010: CPT | Mod: 26

## 2017-06-12 PROCEDURE — 84436 ASSAY OF TOTAL THYROXINE: CPT

## 2017-06-12 PROCEDURE — 84480 ASSAY TRIIODOTHYRONINE (T3): CPT

## 2017-06-12 PROCEDURE — 33427 REPAIR OF MITRAL VALVE: CPT

## 2017-06-12 PROCEDURE — 36620 INSERTION CATHETER ARTERY: CPT

## 2017-06-12 PROCEDURE — 93005 ELECTROCARDIOGRAM TRACING: CPT

## 2017-06-12 PROCEDURE — 83036 HEMOGLOBIN GLYCOSYLATED A1C: CPT

## 2017-06-12 PROCEDURE — 81001 URINALYSIS AUTO W/SCOPE: CPT

## 2017-06-12 PROCEDURE — 93010 ELECTROCARDIOGRAM REPORT: CPT

## 2017-06-12 PROCEDURE — 80053 COMPREHEN METABOLIC PANEL: CPT

## 2017-06-12 PROCEDURE — 85027 COMPLETE CBC AUTOMATED: CPT

## 2017-06-12 PROCEDURE — 85610 PROTHROMBIN TIME: CPT

## 2017-06-12 PROCEDURE — 71046 X-RAY EXAM CHEST 2 VIEWS: CPT

## 2017-06-12 RX ORDER — POTASSIUM CHLORIDE 20 MEQ
10 PACKET (EA) ORAL
Qty: 0 | Refills: 0 | Status: COMPLETED | OUTPATIENT
Start: 2017-06-12 | End: 2017-06-12

## 2017-06-12 RX ORDER — SODIUM CHLORIDE 9 MG/ML
500 INJECTION, SOLUTION INTRAVENOUS ONCE
Qty: 0 | Refills: 0 | Status: COMPLETED | OUTPATIENT
Start: 2017-06-12 | End: 2017-06-12

## 2017-06-12 RX ORDER — VANCOMYCIN HCL 1 G
750 VIAL (EA) INTRAVENOUS EVERY 12 HOURS
Qty: 0 | Refills: 0 | Status: COMPLETED | OUTPATIENT
Start: 2017-06-12 | End: 2017-06-14

## 2017-06-12 RX ORDER — POTASSIUM CHLORIDE 20 MEQ
10 PACKET (EA) ORAL ONCE
Qty: 0 | Refills: 0 | Status: DISCONTINUED | OUTPATIENT
Start: 2017-06-12 | End: 2017-06-14

## 2017-06-12 RX ORDER — IPRATROPIUM/ALBUTEROL SULFATE 18-103MCG
3 AEROSOL WITH ADAPTER (GRAM) INHALATION EVERY 6 HOURS
Qty: 0 | Refills: 0 | Status: DISCONTINUED | OUTPATIENT
Start: 2017-06-12 | End: 2017-06-15

## 2017-06-12 RX ORDER — FENTANYL CITRATE 50 UG/ML
50 INJECTION INTRAVENOUS ONCE
Qty: 0 | Refills: 0 | Status: DISCONTINUED | OUTPATIENT
Start: 2017-06-12 | End: 2017-06-12

## 2017-06-12 RX ORDER — HYDROMORPHONE HYDROCHLORIDE 2 MG/ML
0.5 INJECTION INTRAMUSCULAR; INTRAVENOUS; SUBCUTANEOUS ONCE
Qty: 0 | Refills: 0 | Status: DISCONTINUED | OUTPATIENT
Start: 2017-06-12 | End: 2017-06-12

## 2017-06-12 RX ORDER — KETOROLAC TROMETHAMINE 30 MG/ML
15 SYRINGE (ML) INJECTION EVERY 6 HOURS
Qty: 0 | Refills: 0 | Status: DISCONTINUED | OUTPATIENT
Start: 2017-06-13 | End: 2017-06-13

## 2017-06-12 RX ORDER — ALBUMIN HUMAN 25 %
250 VIAL (ML) INTRAVENOUS ONCE
Qty: 0 | Refills: 0 | Status: COMPLETED | OUTPATIENT
Start: 2017-06-12 | End: 2017-06-12

## 2017-06-12 RX ORDER — KETOROLAC TROMETHAMINE 30 MG/ML
30 SYRINGE (ML) INJECTION ONCE
Qty: 0 | Refills: 0 | Status: DISCONTINUED | OUTPATIENT
Start: 2017-06-12 | End: 2017-06-12

## 2017-06-12 RX ORDER — ASPIRIN/CALCIUM CARB/MAGNESIUM 324 MG
325 TABLET ORAL ONCE
Qty: 0 | Refills: 0 | Status: COMPLETED | OUTPATIENT
Start: 2017-06-12 | End: 2017-06-12

## 2017-06-12 RX ORDER — ACETAMINOPHEN 500 MG
1000 TABLET ORAL ONCE
Qty: 0 | Refills: 0 | Status: COMPLETED | OUTPATIENT
Start: 2017-06-12 | End: 2017-06-12

## 2017-06-12 RX ORDER — METOCLOPRAMIDE HCL 10 MG
10 TABLET ORAL EVERY 8 HOURS
Qty: 0 | Refills: 0 | Status: COMPLETED | OUTPATIENT
Start: 2017-06-12 | End: 2017-06-13

## 2017-06-12 RX ORDER — PROPOFOL 10 MG/ML
30.86 INJECTION, EMULSION INTRAVENOUS
Qty: 1000 | Refills: 0 | Status: DISCONTINUED | OUTPATIENT
Start: 2017-06-12 | End: 2017-06-12

## 2017-06-12 RX ORDER — POTASSIUM CHLORIDE 20 MEQ
10 PACKET (EA) ORAL
Qty: 0 | Refills: 0 | Status: DISCONTINUED | OUTPATIENT
Start: 2017-06-12 | End: 2017-06-14

## 2017-06-12 RX ORDER — SODIUM CHLORIDE 9 MG/ML
3 INJECTION INTRAMUSCULAR; INTRAVENOUS; SUBCUTANEOUS EVERY 8 HOURS
Qty: 0 | Refills: 0 | Status: DISCONTINUED | OUTPATIENT
Start: 2017-06-12 | End: 2017-06-12

## 2017-06-12 RX ORDER — ALBUTEROL 90 UG/1
1 AEROSOL, METERED ORAL EVERY 4 HOURS
Qty: 0 | Refills: 0 | Status: DISCONTINUED | OUTPATIENT
Start: 2017-06-12 | End: 2017-06-15

## 2017-06-12 RX ORDER — ASPIRIN/CALCIUM CARB/MAGNESIUM 324 MG
300 TABLET ORAL ONCE
Qty: 0 | Refills: 0 | Status: COMPLETED | OUTPATIENT
Start: 2017-06-12 | End: 2017-06-12

## 2017-06-12 RX ORDER — NICARDIPINE HYDROCHLORIDE 30 MG/1
3 CAPSULE, EXTENDED RELEASE ORAL
Qty: 40 | Refills: 0 | Status: DISCONTINUED | OUTPATIENT
Start: 2017-06-12 | End: 2017-06-13

## 2017-06-12 RX ORDER — AMIODARONE HYDROCHLORIDE 400 MG/1
400 TABLET ORAL EVERY 8 HOURS
Qty: 0 | Refills: 0 | Status: DISCONTINUED | OUTPATIENT
Start: 2017-06-12 | End: 2017-06-13

## 2017-06-12 RX ORDER — SODIUM CHLORIDE 9 MG/ML
1000 INJECTION INTRAMUSCULAR; INTRAVENOUS; SUBCUTANEOUS
Qty: 0 | Refills: 0 | Status: DISCONTINUED | OUTPATIENT
Start: 2017-06-12 | End: 2017-06-13

## 2017-06-12 RX ORDER — CEFUROXIME AXETIL 250 MG
1500 TABLET ORAL EVERY 8 HOURS
Qty: 0 | Refills: 0 | Status: COMPLETED | OUTPATIENT
Start: 2017-06-12 | End: 2017-06-13

## 2017-06-12 RX ORDER — ASPIRIN/CALCIUM CARB/MAGNESIUM 324 MG
325 TABLET ORAL DAILY
Qty: 0 | Refills: 0 | Status: DISCONTINUED | OUTPATIENT
Start: 2017-06-13 | End: 2017-06-17

## 2017-06-12 RX ORDER — PANTOPRAZOLE SODIUM 20 MG/1
40 TABLET, DELAYED RELEASE ORAL
Qty: 0 | Refills: 0 | Status: DISCONTINUED | OUTPATIENT
Start: 2017-06-13 | End: 2017-06-17

## 2017-06-12 RX ORDER — PANTOPRAZOLE SODIUM 20 MG/1
40 TABLET, DELAYED RELEASE ORAL ONCE
Qty: 0 | Refills: 0 | Status: COMPLETED | OUTPATIENT
Start: 2017-06-12 | End: 2017-06-12

## 2017-06-12 RX ORDER — FENTANYL CITRATE 50 UG/ML
25 INJECTION INTRAVENOUS ONCE
Qty: 0 | Refills: 0 | Status: DISCONTINUED | OUTPATIENT
Start: 2017-06-12 | End: 2017-06-12

## 2017-06-12 RX ORDER — SODIUM CHLORIDE 9 MG/ML
250 INJECTION, SOLUTION INTRAVENOUS ONCE
Qty: 0 | Refills: 0 | Status: COMPLETED | OUTPATIENT
Start: 2017-06-12 | End: 2017-06-12

## 2017-06-12 RX ORDER — MAGNESIUM SULFATE 500 MG/ML
2 VIAL (ML) INJECTION ONCE
Qty: 0 | Refills: 0 | Status: COMPLETED | OUTPATIENT
Start: 2017-06-12 | End: 2017-06-12

## 2017-06-12 RX ORDER — TIOTROPIUM BROMIDE 18 UG/1
1 CAPSULE ORAL; RESPIRATORY (INHALATION) DAILY
Qty: 0 | Refills: 0 | Status: DISCONTINUED | OUTPATIENT
Start: 2017-06-12 | End: 2017-06-15

## 2017-06-12 RX ORDER — DOCUSATE SODIUM 100 MG
100 CAPSULE ORAL THREE TIMES A DAY
Qty: 0 | Refills: 0 | Status: DISCONTINUED | OUTPATIENT
Start: 2017-06-13 | End: 2017-06-17

## 2017-06-12 RX ORDER — NOREPINEPHRINE BITARTRATE/D5W 8 MG/250ML
0.04 PLASTIC BAG, INJECTION (ML) INTRAVENOUS
Qty: 8 | Refills: 0 | Status: DISCONTINUED | OUTPATIENT
Start: 2017-06-12 | End: 2017-06-13

## 2017-06-12 RX ORDER — INSULIN HUMAN 100 [IU]/ML
1.5 INJECTION, SOLUTION SUBCUTANEOUS
Qty: 250 | Refills: 0 | Status: DISCONTINUED | OUTPATIENT
Start: 2017-06-12 | End: 2017-06-14

## 2017-06-12 RX ORDER — AMIODARONE HYDROCHLORIDE 400 MG/1
150 TABLET ORAL ONCE
Qty: 0 | Refills: 0 | Status: DISCONTINUED | OUTPATIENT
Start: 2017-06-12 | End: 2017-06-12

## 2017-06-12 RX ORDER — AMIODARONE HYDROCHLORIDE 400 MG/1
400 TABLET ORAL EVERY 8 HOURS
Qty: 0 | Refills: 0 | Status: DISCONTINUED | OUTPATIENT
Start: 2017-06-12 | End: 2017-06-12

## 2017-06-12 RX ADMIN — Medication 100 MILLIEQUIVALENT(S): at 17:00

## 2017-06-12 RX ADMIN — Medication 150 MILLIGRAM(S): at 19:46

## 2017-06-12 RX ADMIN — Medication 100 MILLIEQUIVALENT(S): at 12:58

## 2017-06-12 RX ADMIN — Medication 30 MILLIGRAM(S): at 18:53

## 2017-06-12 RX ADMIN — SODIUM CHLORIDE 1500 MILLILITER(S): 9 INJECTION, SOLUTION INTRAVENOUS at 13:21

## 2017-06-12 RX ADMIN — Medication 1000 MILLIGRAM(S): at 15:30

## 2017-06-12 RX ADMIN — Medication 750 MILLILITER(S): at 13:21

## 2017-06-12 RX ADMIN — Medication 100 MILLIEQUIVALENT(S): at 12:30

## 2017-06-12 RX ADMIN — FENTANYL CITRATE 50 MICROGRAM(S): 50 INJECTION INTRAVENOUS at 14:05

## 2017-06-12 RX ADMIN — Medication 100 MILLIGRAM(S): at 13:22

## 2017-06-12 RX ADMIN — SODIUM CHLORIDE 6000 MILLILITER(S): 9 INJECTION, SOLUTION INTRAVENOUS at 12:56

## 2017-06-12 RX ADMIN — Medication 100 MILLIGRAM(S): at 22:49

## 2017-06-12 RX ADMIN — INSULIN HUMAN 1.5 UNIT(S)/HR: 100 INJECTION, SOLUTION SUBCUTANEOUS at 14:24

## 2017-06-12 RX ADMIN — Medication 1500 MILLILITER(S): at 15:00

## 2017-06-12 RX ADMIN — HYDROMORPHONE HYDROCHLORIDE 0.5 MILLIGRAM(S): 2 INJECTION INTRAMUSCULAR; INTRAVENOUS; SUBCUTANEOUS at 21:02

## 2017-06-12 RX ADMIN — Medication 30 MILLIGRAM(S): at 19:04

## 2017-06-12 RX ADMIN — FENTANYL CITRATE 25 MICROGRAM(S): 50 INJECTION INTRAVENOUS at 13:35

## 2017-06-12 RX ADMIN — FENTANYL CITRATE 25 MICROGRAM(S): 50 INJECTION INTRAVENOUS at 13:20

## 2017-06-12 RX ADMIN — HYDROMORPHONE HYDROCHLORIDE 0.5 MILLIGRAM(S): 2 INJECTION INTRAMUSCULAR; INTRAVENOUS; SUBCUTANEOUS at 20:46

## 2017-06-12 RX ADMIN — Medication 400 MILLIGRAM(S): at 23:29

## 2017-06-12 RX ADMIN — PANTOPRAZOLE SODIUM 40 MILLIGRAM(S): 20 TABLET, DELAYED RELEASE ORAL at 13:22

## 2017-06-12 RX ADMIN — FENTANYL CITRATE 50 MICROGRAM(S): 50 INJECTION INTRAVENOUS at 14:20

## 2017-06-12 RX ADMIN — Medication 125 MILLILITER(S): at 23:20

## 2017-06-12 RX ADMIN — SODIUM CHLORIDE 1500 MILLILITER(S): 9 INJECTION, SOLUTION INTRAVENOUS at 11:45

## 2017-06-12 RX ADMIN — Medication 100 MILLIEQUIVALENT(S): at 16:30

## 2017-06-12 RX ADMIN — Medication 325 MILLIGRAM(S): at 21:43

## 2017-06-12 RX ADMIN — Medication 10 MILLIGRAM(S): at 13:22

## 2017-06-12 RX ADMIN — Medication 50 GRAM(S): at 21:53

## 2017-06-12 RX ADMIN — Medication 100 MILLIEQUIVALENT(S): at 17:39

## 2017-06-12 RX ADMIN — Medication 10 MILLIGRAM(S): at 22:53

## 2017-06-12 RX ADMIN — Medication 100 MILLIEQUIVALENT(S): at 12:00

## 2017-06-12 RX ADMIN — Medication 1000 MILLIGRAM(S): at 23:44

## 2017-06-12 RX ADMIN — AMIODARONE HYDROCHLORIDE 400 MILLIGRAM(S): 400 TABLET ORAL at 23:17

## 2017-06-12 RX ADMIN — Medication 400 MILLIGRAM(S): at 15:15

## 2017-06-12 NOTE — PROCEDURE NOTE - NSPROCDETAILS_GEN_ALL_CORE
location identified, draped/prepped, sterile technique used, needle inserted/introduced/positive blood return obtained via catheter/sutured in place/connected to a pressurized flush line/Seldinger technique/all materials/supplies accounted for at end of procedure

## 2017-06-12 NOTE — BRIEF OPERATIVE NOTE - PROCEDURE
PFO (patent foramen ovale), primary closure  06/12/2017    Active  HANANE  Mitral valve repair  06/12/2017    Active  HANANE

## 2017-06-13 DIAGNOSIS — J45.30 MILD PERSISTENT ASTHMA, UNCOMPLICATED: ICD-10-CM

## 2017-06-13 DIAGNOSIS — Q21.1 ATRIAL SEPTAL DEFECT: ICD-10-CM

## 2017-06-13 DIAGNOSIS — R73.9 HYPERGLYCEMIA, UNSPECIFIED: ICD-10-CM

## 2017-06-13 DIAGNOSIS — Z29.9 ENCOUNTER FOR PROPHYLACTIC MEASURES, UNSPECIFIED: ICD-10-CM

## 2017-06-13 DIAGNOSIS — I49.8 OTHER SPECIFIED CARDIAC ARRHYTHMIAS: ICD-10-CM

## 2017-06-13 DIAGNOSIS — E78.5 HYPERLIPIDEMIA, UNSPECIFIED: ICD-10-CM

## 2017-06-13 LAB
ALBUMIN SERPL ELPH-MCNC: 3.3 G/DL — SIGNIFICANT CHANGE UP (ref 3.3–5.2)
ALBUMIN SERPL ELPH-MCNC: 3.3 G/DL — SIGNIFICANT CHANGE UP (ref 3.3–5.2)
ALP SERPL-CCNC: 37 U/L — LOW (ref 40–120)
ALP SERPL-CCNC: 41 U/L — SIGNIFICANT CHANGE UP (ref 40–120)
ALT FLD-CCNC: 56 U/L — HIGH
ALT FLD-CCNC: 68 U/L — HIGH
ANION GAP SERPL CALC-SCNC: 13 MMOL/L — SIGNIFICANT CHANGE UP (ref 5–17)
ANION GAP SERPL CALC-SCNC: 14 MMOL/L — SIGNIFICANT CHANGE UP (ref 5–17)
AST SERPL-CCNC: 58 U/L — HIGH
AST SERPL-CCNC: 86 U/L — HIGH
BILIRUB DIRECT SERPL-MCNC: 0.5 MG/DL — HIGH (ref 0–0.3)
BILIRUB INDIRECT FLD-MCNC: 0.6 MG/DL — SIGNIFICANT CHANGE UP (ref 0.2–1)
BILIRUB SERPL-MCNC: 0.7 MG/DL — SIGNIFICANT CHANGE UP (ref 0.4–2)
BILIRUB SERPL-MCNC: 1.1 MG/DL — SIGNIFICANT CHANGE UP (ref 0.4–2)
BUN SERPL-MCNC: 15 MG/DL — SIGNIFICANT CHANGE UP (ref 8–20)
BUN SERPL-MCNC: 15 MG/DL — SIGNIFICANT CHANGE UP (ref 8–20)
CALCIUM SERPL-MCNC: 7.6 MG/DL — LOW (ref 8.6–10.2)
CALCIUM SERPL-MCNC: 7.8 MG/DL — LOW (ref 8.6–10.2)
CHLORIDE SERPL-SCNC: 104 MMOL/L — SIGNIFICANT CHANGE UP (ref 98–107)
CHLORIDE SERPL-SCNC: 106 MMOL/L — SIGNIFICANT CHANGE UP (ref 98–107)
CK MB CFR SERPL CALC: 33.5 NG/ML — HIGH (ref 0–6.7)
CK SERPL-CCNC: 413 U/L — HIGH (ref 25–170)
CO2 SERPL-SCNC: 20 MMOL/L — LOW (ref 22–29)
CO2 SERPL-SCNC: 21 MMOL/L — LOW (ref 22–29)
CREAT SERPL-MCNC: 0.45 MG/DL — LOW (ref 0.5–1.3)
CREAT SERPL-MCNC: 0.46 MG/DL — LOW (ref 0.5–1.3)
GLUCOSE SERPL-MCNC: 111 MG/DL — SIGNIFICANT CHANGE UP (ref 70–115)
GLUCOSE SERPL-MCNC: 130 MG/DL — HIGH (ref 70–115)
HCT VFR BLD CALC: 32.5 % — LOW (ref 37–47)
HCT VFR BLD CALC: 33.2 % — LOW (ref 37–47)
HGB BLD-MCNC: 10.8 G/DL — LOW (ref 12–16)
HGB BLD-MCNC: 11.1 G/DL — LOW (ref 12–16)
INR BLD: 1.2 RATIO — HIGH (ref 0.88–1.16)
INR BLD: 1.2 RATIO — HIGH (ref 0.88–1.16)
MAGNESIUM SERPL-MCNC: 2.4 MG/DL — SIGNIFICANT CHANGE UP (ref 1.6–2.6)
MAGNESIUM SERPL-MCNC: 3.1 MG/DL — HIGH (ref 1.6–2.6)
MCHC RBC-ENTMCNC: 30.2 PG — SIGNIFICANT CHANGE UP (ref 27–31)
MCHC RBC-ENTMCNC: 30.2 PG — SIGNIFICANT CHANGE UP (ref 27–31)
MCHC RBC-ENTMCNC: 33.2 G/DL — SIGNIFICANT CHANGE UP (ref 32–36)
MCHC RBC-ENTMCNC: 33.4 G/DL — SIGNIFICANT CHANGE UP (ref 32–36)
MCV RBC AUTO: 90.2 FL — SIGNIFICANT CHANGE UP (ref 81–99)
MCV RBC AUTO: 90.8 FL — SIGNIFICANT CHANGE UP (ref 81–99)
PHOSPHATE SERPL-MCNC: 3.2 MG/DL — SIGNIFICANT CHANGE UP (ref 2.4–4.7)
PHOSPHATE SERPL-MCNC: 3.3 MG/DL — SIGNIFICANT CHANGE UP (ref 2.4–4.7)
PLATELET # BLD AUTO: 81 K/UL — LOW (ref 150–400)
PLATELET # BLD AUTO: 91 K/UL — LOW (ref 150–400)
POTASSIUM SERPL-MCNC: 4 MMOL/L — SIGNIFICANT CHANGE UP (ref 3.5–5.3)
POTASSIUM SERPL-MCNC: 4.3 MMOL/L — SIGNIFICANT CHANGE UP (ref 3.5–5.3)
POTASSIUM SERPL-SCNC: 4 MMOL/L — SIGNIFICANT CHANGE UP (ref 3.5–5.3)
POTASSIUM SERPL-SCNC: 4.3 MMOL/L — SIGNIFICANT CHANGE UP (ref 3.5–5.3)
PROT SERPL-MCNC: 4.8 G/DL — LOW (ref 6.6–8.7)
PROT SERPL-MCNC: 5 G/DL — LOW (ref 6.6–8.7)
PROTHROM AB SERPL-ACNC: 13.2 SEC — HIGH (ref 9.8–12.7)
PROTHROM AB SERPL-ACNC: 13.3 SEC — HIGH (ref 9.8–12.7)
RBC # BLD: 3.58 M/UL — LOW (ref 4.4–5.2)
RBC # BLD: 3.68 M/UL — LOW (ref 4.4–5.2)
RBC # FLD: 14.5 % — SIGNIFICANT CHANGE UP (ref 11–15.6)
RBC # FLD: 14.6 % — SIGNIFICANT CHANGE UP (ref 11–15.6)
SODIUM SERPL-SCNC: 138 MMOL/L — SIGNIFICANT CHANGE UP (ref 135–145)
SODIUM SERPL-SCNC: 140 MMOL/L — SIGNIFICANT CHANGE UP (ref 135–145)
TROPONIN T SERPL-MCNC: 0.46 NG/ML — HIGH (ref 0–0.06)
WBC # BLD: 11.7 K/UL — HIGH (ref 4.8–10.8)
WBC # BLD: 13.7 K/UL — HIGH (ref 4.8–10.8)
WBC # FLD AUTO: 11.7 K/UL — HIGH (ref 4.8–10.8)
WBC # FLD AUTO: 13.7 K/UL — HIGH (ref 4.8–10.8)

## 2017-06-13 PROCEDURE — 93010 ELECTROCARDIOGRAM REPORT: CPT

## 2017-06-13 PROCEDURE — 71010: CPT | Mod: 26,77

## 2017-06-13 PROCEDURE — 99222 1ST HOSP IP/OBS MODERATE 55: CPT

## 2017-06-13 PROCEDURE — 71010: CPT | Mod: 26

## 2017-06-13 RX ORDER — FUROSEMIDE 40 MG
20 TABLET ORAL DAILY
Qty: 0 | Refills: 0 | Status: DISCONTINUED | OUTPATIENT
Start: 2017-06-13 | End: 2017-06-15

## 2017-06-13 RX ORDER — POTASSIUM CHLORIDE 20 MEQ
20 PACKET (EA) ORAL DAILY
Qty: 0 | Refills: 0 | Status: DISCONTINUED | OUTPATIENT
Start: 2017-06-13 | End: 2017-06-14

## 2017-06-13 RX ORDER — POTASSIUM CHLORIDE 20 MEQ
10 PACKET (EA) ORAL ONCE
Qty: 0 | Refills: 0 | Status: COMPLETED | OUTPATIENT
Start: 2017-06-13 | End: 2017-06-13

## 2017-06-13 RX ORDER — ONDANSETRON 8 MG/1
4 TABLET, FILM COATED ORAL ONCE
Qty: 0 | Refills: 0 | Status: COMPLETED | OUTPATIENT
Start: 2017-06-13 | End: 2017-06-13

## 2017-06-13 RX ORDER — AMIODARONE HYDROCHLORIDE 400 MG/1
200 TABLET ORAL EVERY 12 HOURS
Qty: 0 | Refills: 0 | Status: DISCONTINUED | OUTPATIENT
Start: 2017-06-14 | End: 2017-06-14

## 2017-06-13 RX ORDER — FUROSEMIDE 40 MG
20 TABLET ORAL ONCE
Qty: 0 | Refills: 0 | Status: COMPLETED | OUTPATIENT
Start: 2017-06-13 | End: 2017-06-13

## 2017-06-13 RX ORDER — HYDROMORPHONE HYDROCHLORIDE 2 MG/ML
0.5 INJECTION INTRAMUSCULAR; INTRAVENOUS; SUBCUTANEOUS ONCE
Qty: 0 | Refills: 0 | Status: DISCONTINUED | OUTPATIENT
Start: 2017-06-13 | End: 2017-06-13

## 2017-06-13 RX ORDER — ENOXAPARIN SODIUM 100 MG/ML
40 INJECTION SUBCUTANEOUS DAILY
Qty: 0 | Refills: 0 | Status: DISCONTINUED | OUTPATIENT
Start: 2017-06-13 | End: 2017-06-14

## 2017-06-13 RX ADMIN — HYDROMORPHONE HYDROCHLORIDE 0.5 MILLIGRAM(S): 2 INJECTION INTRAMUSCULAR; INTRAVENOUS; SUBCUTANEOUS at 06:06

## 2017-06-13 RX ADMIN — Medication 100 MILLIEQUIVALENT(S): at 07:10

## 2017-06-13 RX ADMIN — Medication 20 MILLIGRAM(S): at 14:40

## 2017-06-13 RX ADMIN — HYDROMORPHONE HYDROCHLORIDE 0.5 MILLIGRAM(S): 2 INJECTION INTRAMUSCULAR; INTRAVENOUS; SUBCUTANEOUS at 02:15

## 2017-06-13 RX ADMIN — Medication 10 MILLIGRAM(S): at 05:19

## 2017-06-13 RX ADMIN — Medication 100 MILLIGRAM(S): at 05:19

## 2017-06-13 RX ADMIN — Medication 100 MILLIGRAM(S): at 07:01

## 2017-06-13 RX ADMIN — Medication 20 MILLIGRAM(S): at 10:17

## 2017-06-13 RX ADMIN — HYDROMORPHONE HYDROCHLORIDE 0.5 MILLIGRAM(S): 2 INJECTION INTRAMUSCULAR; INTRAVENOUS; SUBCUTANEOUS at 06:15

## 2017-06-13 RX ADMIN — Medication 3 MILLILITER(S): at 08:22

## 2017-06-13 RX ADMIN — Medication 15 MILLIGRAM(S): at 00:35

## 2017-06-13 RX ADMIN — Medication 15 MILLIGRAM(S): at 21:56

## 2017-06-13 RX ADMIN — ONDANSETRON 4 MILLIGRAM(S): 8 TABLET, FILM COATED ORAL at 06:05

## 2017-06-13 RX ADMIN — Medication 100 MILLIGRAM(S): at 13:03

## 2017-06-13 RX ADMIN — Medication 15 MILLIGRAM(S): at 00:18

## 2017-06-13 RX ADMIN — Medication 3 MILLILITER(S): at 01:22

## 2017-06-13 RX ADMIN — HYDROMORPHONE HYDROCHLORIDE 0.5 MILLIGRAM(S): 2 INJECTION INTRAMUSCULAR; INTRAVENOUS; SUBCUTANEOUS at 02:00

## 2017-06-13 RX ADMIN — Medication 150 MILLIGRAM(S): at 09:18

## 2017-06-13 RX ADMIN — AMIODARONE HYDROCHLORIDE 400 MILLIGRAM(S): 400 TABLET ORAL at 14:40

## 2017-06-13 RX ADMIN — Medication 15 MILLIGRAM(S): at 10:30

## 2017-06-13 RX ADMIN — Medication 150 MILLIGRAM(S): at 19:54

## 2017-06-13 RX ADMIN — Medication 15 MILLIGRAM(S): at 21:57

## 2017-06-13 RX ADMIN — Medication 20 MILLIEQUIVALENT(S): at 14:39

## 2017-06-13 RX ADMIN — Medication 100 MILLIGRAM(S): at 22:04

## 2017-06-13 RX ADMIN — ONDANSETRON 4 MILLIGRAM(S): 8 TABLET, FILM COATED ORAL at 19:54

## 2017-06-13 RX ADMIN — Medication 15 MILLIGRAM(S): at 10:17

## 2017-06-13 RX ADMIN — Medication 100 MILLIGRAM(S): at 14:39

## 2017-06-13 RX ADMIN — AMIODARONE HYDROCHLORIDE 400 MILLIGRAM(S): 400 TABLET ORAL at 07:01

## 2017-06-13 RX ADMIN — Medication 3 MILLILITER(S): at 19:31

## 2017-06-13 RX ADMIN — Medication 325 MILLIGRAM(S): at 11:57

## 2017-06-13 RX ADMIN — Medication 100 MILLIGRAM(S): at 21:16

## 2017-06-13 RX ADMIN — PANTOPRAZOLE SODIUM 40 MILLIGRAM(S): 20 TABLET, DELAYED RELEASE ORAL at 07:01

## 2017-06-13 NOTE — PHYSICAL THERAPY INITIAL EVALUATION ADULT - GENERAL OBSERVATIONS, REHAB EVAL
Pt rec'd in room sitting upright in chair at bedside, breathing RA, chest tube/drain x2, mcelroy, tele box, pacer and IV in place.

## 2017-06-13 NOTE — PROGRESS NOTE ADULT - SUBJECTIVE AND OBJECTIVE BOX
CC: c/o pain throughout the night; without acute complaints     Subjective:   71 F w/ hx HLD, reactive airway disease, MR, AI, small PRO and tonsilectomy admitted through SDA with known MR for valve repair     T(C): 37.3, Max: 37.5 (06-12 @ 22:00)  HR: 74 (66 - 92)  BP: 111/59 (91/53 - 116/59)  ABP: 93/64 (83/71 - 138/76)  ABP(mean): 77 (64 - 106)  RR: 12 (0 - 46)  SpO2: 100% (90% - 100%)  CVP(mm Hg): 10 (2 - 15)    Physical examination  Patient seen and examined bedside   Neuro: alert and oriented x 3 without focal defects   Pulm: course throughout, without wheezing   CV: +S1S2 RRR   Abd: soft nontender without guarding MS x 2, 1V wire 1 ground   Extrem/MS: +pulses trace edema   Incision(s): +dressing without active drainage       06-12    142  |  110<H>  |  16.0  ----------------------------<  153<H>  3.9   |  19.0<L>  |  0.45<L>    Ca    7.5<L>      12 Jun 2017 12:21  Phos  2.3     06-12  Mg     2.8     06-12    TPro  3.9<L>  /  Alb  2.6<L>  /  TBili  0.8  /  DBili  0.1  /  AST  58<H>  /  ALT  25  /  AlkPhos  26<L>  06-12                        11.8   17.4  )-----------( 90       ( 12 Jun 2017 12:21 )             35.6     PT/INR - ( 12 Jun 2017 12:21 )   PT: 15.2 sec;   INR: 1.37 ratio    PTT - ( 12 Jun 2017 12:21 )  PTT:31.1 sec        CAPILLARY BLOOD GLUCOSE  132 (13 Jun 2017 02:00)  127 (13 Jun 2017 01:00)  127 (13 Jun 2017 00:00)  133 (12 Jun 2017 23:00)  127 (12 Jun 2017 22:00)  124 (12 Jun 2017 21:00)  118 (12 Jun 2017 20:00)  109 (12 Jun 2017 18:45)  129 (12 Jun 2017 17:45)  157 (12 Jun 2017 16:30)  164 (12 Jun 2017 15:30)  164 (12 Jun 2017 14:30)  150 (12 Jun 2017 13:00)  146 (12 Jun 2017 12:00)           Mode: CPAP with PS  FiO2: 40  PEEP: 5  PS: 5   ABG - ( 12 Jun 2017 21:26 )  pH: 7.34  /  pCO2: 40    /  pO2: 161   / HCO3: 21    / Base Excess: -3.8  /  SaO2: 99          CXR:  Findings:    ET tube above the cresencio. Right IJ central venous catheter tip in   proximal superior vena cava. Median sternotomy sutures. Multiple midline   surgical staples. Multiple chest tubes. No pneumothorax. No acute   pulmonary process seen. Postop right basal discoid atelectasis.    Impression:  No pneumothorax.  No acute pulmonary process seen.  Postoperative changes.    I&O's Detail    I & Os for current day (as of 13 Jun 2017 03:44)  =============================================  IN:    Lactated Ringers IV Bolus: 1000 ml    Albumin 5%  - 250 mL: 750 ml    Solution: 300 ml    Oral Fluid: 200 ml    Solution: 200 ml    sodium chloride 0.9%.: 150 ml    Solution: 150 ml    Solution: 100 ml    sodium chloride 0.9%.: 75 ml    Solution: 50 ml    norepinephrine Infusion: 30 ml    insulin Infusion: 15 ml    niCARdipine Infusion: 10 ml    Total IN: 3030 ml  ---------------------------------------------  OUT:    Indwelling Catheter - Urethral: 1110 ml    Chest Tube: 240 ml    Chest Tube: 150 ml    Total OUT: 1500 ml  ---------------------------------------------  Total NET: 1530 ml    Weight (kg): 54 (06-12 @ 05:47)Drug Dosing Weight  Height (cm): 152.4 (12 Jun 2017 05:47)  Weight (kg): 54 (12 Jun 2017 05:47)  BMI (kg/m2): 23.3 (12 Jun 2017 05:47)  BSA (m2): 1.5 (12 Jun 2017 05:47)    MEDICATIONS  (STANDING):  docusate sodium 100milliGRAM(s) Oral three times a day  potassium chloride  10 mEq/50 mL IVPB 10milliEquivalent(s) IV Intermittent every 1 hour  potassium chloride  10 mEq/50 mL IVPB 10milliEquivalent(s) IV Intermittent every 1 hour  potassium chloride  10 mEq/50 mL IVPB 10milliEquivalent(s) IV Intermittent once  sodium chloride 0.9%. 1000milliLiter(s) IV Continuous <Continuous>  sodium chloride 0.9%. 1000milliLiter(s) IV Continuous <Continuous>  norepinephrine Infusion 0.04MICROgram(s)/kG/Min IV Continuous <Continuous>  niCARdipine Infusion 3mG/Hr IV Continuous <Continuous>  aspirin enteric coated 325milliGRAM(s) Oral daily  cefuroxime  IVPB 1500milliGRAM(s) IV Intermittent every 8 hours  vancomycin  IVPB 750milliGRAM(s) IV Intermittent every 12 hours  metoclopramide Injectable 10milliGRAM(s) IV Push every 8 hours  insulin Infusion 1.5Unit(s)/Hr IV Continuous <Continuous>  pantoprazole    Tablet 40milliGRAM(s) Oral before breakfast  ALBUTerol/ipratropium for Nebulization 3milliLiter(s) Nebulizer every 6 hours  ALBUTerol    90 MICROgram(s) HFA Inhaler 1Puff(s) Inhalation every 4 hours  tiotropium 18 MICROgram(s) Capsule 1Capsule(s) Inhalation daily  amiodarone    Tablet 400milliGRAM(s) Oral every 8 hours  HYDROmorphone  Injectable 0.5milliGRAM(s) IV Push once    MEDICATIONS  (PRN):  ketorolac   Injectable 15milliGRAM(s) IV Push every 6 hours PRN pain

## 2017-06-13 NOTE — CONSULT NOTE ADULT - ASSESSMENT
Assessment and Plan:    71 with hx HLD who was found to have severe MR  admitted for elective MV repair surgery, preop cath showed Normal coronaries,  and had surgery now post op Day #1 MV Repair and PFO closure. She is doing well from cardiac standpoint. She denied CP, dyspnea, orthopnea, PND, edema.   She was started on Amiodarone in OR.  She is currently maintaining SR.  She admitted nausea, Amiodarone is reduced.    Post op Day #1 MV Repair and PFO closure.   She was started on Amiodarone in OR.  Mild intermittent reactive airway disease without complication  Aortic valve insufficiency  Mitral regurgitation: severe  Pulmonary hypertension: 49-54 mmHG moderate PH  Hyperlipidemia: diet controlled    Stable from  cardiac standpoint.  Maintaining SR.  Ct supportive care.  She was started on Amiodarone in OR.  She is currently maintaining SR.  She admitted nausea, Amiodarone is reduced.

## 2017-06-13 NOTE — CHART NOTE - NSCHARTNOTEFT_GEN_A_CORE
POD #1 s/p MVR  Pt without complaints.  No apparent anesthetic complications.  All questions answered.  Continue management as per CTICU

## 2017-06-13 NOTE — CONSULT NOTE ADULT - SUBJECTIVE AND OBJECTIVE BOX
CARDIOLOGY CONSULTATION NOTE       History obtained by: Patient, family and medical record     obtained: No    Chief complaint:      Patient is a 71y old  Female who presents with a chief complaint of "valve repair" (22 May 2017 12:10)      HPI:  71 with hx HLD who was found to have severe MR  admitted for elective MV repair surgery, preop cath showed Normal coronaries,  and had surgery now post op Day #1 MV Repair and PFO closure. She is doing well from cardiac standpoint. She denied CP, dyspnea, orthopnea, PND,edema.       REVIEW OF SYMPTOMS:   Constitutional: Denied: fever, chills, weight loss or gain  Eyes: Denied: reddened ayes, eye discharge, eye pain  ENMT: Denied: ear pain, nasal discharge, mouth pain, throat pain or swelling  Cardiovascular: Denied: chest pain,  Chest pressure, palpitation, arrhythmia, irregular or fast heart beats, edema  Respiratory: Denied: cough, phlegm production, wheezes, dyspnea, orthopnea, PND,   GI: Denied: Abdominal pain, nausea, vomiting, diarrhea, constipation, melena, rectal bleed  : Denied: hematuria, frequency  Musculoskeletal: Denied: Muscle aches, weakness, pain  Hematology/lymp: Denied: Bleeding disorder, anemia, blood clotting  Neuro: Denied: Headache, light headedness, dizziness, numbness, aphasia, dysarthria, seizure,  syncope, near syncope  Psych: Denied: depression, anxiety  Integumentary/skin: Denied: rash, bruises, ecchymosis, itching  Allergy/Immunology: denied environmental or drug allergies    ALL OTHER REVIEW OF SYSTEMS ARE NEGATIVE.    MEDICATIONS  (STANDING):  docusate sodium 100milliGRAM(s) Oral three times a day  potassium chloride  10 mEq/50 mL IVPB 10milliEquivalent(s) IV Intermittent every 1 hour  potassium chloride  10 mEq/50 mL IVPB 10milliEquivalent(s) IV Intermittent every 1 hour  potassium chloride  10 mEq/50 mL IVPB 10milliEquivalent(s) IV Intermittent once  sodium chloride 0.9%. 1000milliLiter(s) IV Continuous <Continuous>  sodium chloride 0.9%. 1000milliLiter(s) IV Continuous <Continuous>  aspirin enteric coated 325milliGRAM(s) Oral daily  cefuroxime  IVPB 1500milliGRAM(s) IV Intermittent every 8 hours  vancomycin  IVPB 750milliGRAM(s) IV Intermittent every 12 hours  insulin Infusion 1.5Unit(s)/Hr IV Continuous <Continuous>  pantoprazole    Tablet 40milliGRAM(s) Oral before breakfast  ALBUTerol/ipratropium for Nebulization 3milliLiter(s) Nebulizer every 6 hours  ALBUTerol    90 MICROgram(s) HFA Inhaler 1Puff(s) Inhalation every 4 hours  tiotropium 18 MICROgram(s) Capsule 1Capsule(s) Inhalation daily  furosemide    Tablet 20milliGRAM(s) Oral daily  potassium chloride    Tablet ER 20milliEquivalent(s) Oral daily  enoxaparin Injectable 40milliGRAM(s) SubCutaneous daily  ondansetron Injectable 4milliGRAM(s) IV Push once    MEDICATIONS  (PRN):  ketorolac   Injectable 15milliGRAM(s) IV Push every 6 hours PRN pain  oxyCODONE  5 mG/acetaminophen 325 mG 1Tablet(s) Oral every 4 hours PRN Mild Pain (1 - 3)  oxyCODONE  5 mG/acetaminophen 325 mG 2Tablet(s) Oral every 4 hours PRN Severe Pain (7 - 10)        PAST MEDICAL & SURGICAL HISTORY:  Pure hypercholesterolemia  Mild intermittent reactive airway disease without complication  Systolic murmur  Aortic regurgitation: moderate  Tricuspid regurgitation: moderate  Aortic valve insufficiency  Mitral regurgitation: severe  Pulmonary hypertension: 49-54 mmHG moderate PH  Cortical cataract of both eyes  Posterior vitreous detachment of both eyes  Psoriasis  Hyperlipidemia: diet controlled  Asthma  History of tonsillectomy  No significant past surgical history      FAMILY HISTORY:  No pertinent family history in first degree relatives      SOCIAL HISTORY:    CIGARETTES:  No    ALCOHOL: No    DRUGS: No    Vital Signs Last 24 Hrs  T(C): 37.6, Max: 37.7 (06-13 @ 13:00)  T(F): 99.7, Max: 99.9 (06-13 @ 13:00)  HR: 74 (66 - 84)  BP: 98/55 (91/53 - 134/60)  BP(mean): 71 (69 - 86)  RR: 20 (0 - 46)  SpO2: 84% (84% - 100%)    PHYSICAL EXAM:      Constitutional: No fever, chills, NAD, Comfortable    Eyes: Not reddened, no discharge    ENMT: No discharge, No pain    Neck: No JVD, No Bruit    Back: No CVA tenderness    Respiratory: Clear to auscultation bilaterally    Cardiovascular: RRR, Normal S1-2, No S3-, No friction rub murmur    Gastrointestinal: Soft, NT/ND. BS+, No organomegaly    Extremities: No edema    Vascular: Distal pulses intact    Neurological: Alert, awake, oriented, able to move extremities, speach is clear    Skin: No ecchymosis, no rash    Musculoskeletal: Non tender, no weaknss    Psychiatric: Aproppriate mood, no anxiety        INTERPRETATION OF TELEMETRY:      I&O's Detail  I & Os for 24h ending 13 Jun 2017 07:00  =============================================  IN:    Lactated Ringers IV Bolus: 1000 ml    Albumin 5%  - 250 mL: 750 ml    Solution: 350 ml    Oral Fluid: 200 ml    sodium chloride 0.9%.: 200 ml    Solution: 200 ml    Solution: 150 ml    Solution: 150 ml    sodium chloride 0.9%.: 100 ml    Solution: 50 ml    norepinephrine Infusion: 32 ml    insulin Infusion: 19 ml    niCARdipine Infusion: 10 ml    Total IN: 3211 ml  ---------------------------------------------  OUT:    Indwelling Catheter - Urethral: 1330 ml    Chest Tube: 370 ml    Chest Tube: 120 ml    Total OUT: 1820 ml  ---------------------------------------------  Total NET: 1391 ml    I & Os for current day (as of 13 Jun 2017 19:22)  =============================================  IN:    Oral Fluid: 960 ml    Solution: 150 ml    sodium chloride 0.9%.: 120 ml    sodium chloride 0.9%.: 60 ml    Solution: 50 ml    Total IN: 1340 ml  ---------------------------------------------  OUT:    Indwelling Catheter - Urethral: 805 ml    Voided: 100 ml    Chest Tube: 50 ml    Total OUT: 955 ml  ---------------------------------------------  Total NET: 385 ml      LABS:                        10.8   13.7  )-----------( 91       ( 13 Jun 2017 14:35 )             32.5     06-13    138  |  104  |  15.0  ----------------------------<  111  4.0   |  21.0<L>  |  0.46<L>    Ca    7.8<L>      13 Jun 2017 14:35  Phos  3.2     06-13  Mg     2.4     06-13    TPro  5.0<L>  /  Alb  3.3  /  TBili  0.7  /  DBili  x   /  AST  58<H>  /  ALT  56<H>  /  AlkPhos  41  06-13    CARDIAC MARKERS ( 13 Jun 2017 04:47 )  x     / 0.46 ng/mL / 413 U/L / x     / 33.5 ng/mL  CARDIAC MARKERS ( 12 Jun 2017 12:21 )  x     / 0.64 ng/mL / 268 U/L / x     / 44.9 ng/mL      PT/INR - ( 13 Jun 2017 14:35 )   PT: 13.2 sec;   INR: 1.20 ratio         PTT - ( 12 Jun 2017 12:21 )  PTT:31.1 sec    I&O's Summary  I & Os for 24h ending 13 Jun 2017 07:00  =============================================  IN: 3211 ml / OUT: 1820 ml / NET: 1391 ml    I & Os for current day (as of 13 Jun 2017 19:22)  =============================================  IN: 1340 ml / OUT: 955 ml / NET: 385 ml      RADIOLOGY & ADDITIONAL STUDIES:  X-ray:    PREVIOUS DIAGNOSTIC TESTING:

## 2017-06-13 NOTE — PHYSICAL THERAPY INITIAL EVALUATION ADULT - PERTINENT HX OF CURRENT PROBLEM, REHAB EVAL
Pt presents to [facility] with reports of abnormal cardiac findings and is now being seen s/p PFO/mitral valve repair

## 2017-06-14 LAB
ANION GAP SERPL CALC-SCNC: 12 MMOL/L — SIGNIFICANT CHANGE UP (ref 5–17)
BUN SERPL-MCNC: 18 MG/DL — SIGNIFICANT CHANGE UP (ref 8–20)
CALCIUM SERPL-MCNC: 7.9 MG/DL — LOW (ref 8.6–10.2)
CHLORIDE SERPL-SCNC: 105 MMOL/L — SIGNIFICANT CHANGE UP (ref 98–107)
CO2 SERPL-SCNC: 22 MMOL/L — SIGNIFICANT CHANGE UP (ref 22–29)
CREAT SERPL-MCNC: 0.57 MG/DL — SIGNIFICANT CHANGE UP (ref 0.5–1.3)
GLUCOSE SERPL-MCNC: 114 MG/DL — SIGNIFICANT CHANGE UP (ref 70–115)
HCT VFR BLD CALC: 31 % — LOW (ref 37–47)
HGB BLD-MCNC: 10.4 G/DL — LOW (ref 12–16)
MAGNESIUM SERPL-MCNC: 2.5 MG/DL — SIGNIFICANT CHANGE UP (ref 1.6–2.6)
MCHC RBC-ENTMCNC: 30.5 PG — SIGNIFICANT CHANGE UP (ref 27–31)
MCHC RBC-ENTMCNC: 33.5 G/DL — SIGNIFICANT CHANGE UP (ref 32–36)
MCV RBC AUTO: 90.9 FL — SIGNIFICANT CHANGE UP (ref 81–99)
PLATELET # BLD AUTO: 75 K/UL — LOW (ref 150–400)
POTASSIUM SERPL-MCNC: 4.4 MMOL/L — SIGNIFICANT CHANGE UP (ref 3.5–5.3)
POTASSIUM SERPL-SCNC: 4.4 MMOL/L — SIGNIFICANT CHANGE UP (ref 3.5–5.3)
RBC # BLD: 3.41 M/UL — LOW (ref 4.4–5.2)
RBC # FLD: 14.6 % — SIGNIFICANT CHANGE UP (ref 11–15.6)
SODIUM SERPL-SCNC: 139 MMOL/L — SIGNIFICANT CHANGE UP (ref 135–145)
WBC # BLD: 14.5 K/UL — HIGH (ref 4.8–10.8)
WBC # FLD AUTO: 14.5 K/UL — HIGH (ref 4.8–10.8)

## 2017-06-14 PROCEDURE — 93010 ELECTROCARDIOGRAM REPORT: CPT

## 2017-06-14 PROCEDURE — 99232 SBSQ HOSP IP/OBS MODERATE 35: CPT

## 2017-06-14 PROCEDURE — 71010: CPT | Mod: 26

## 2017-06-14 PROCEDURE — 74000: CPT | Mod: 26

## 2017-06-14 RX ORDER — HYDROMORPHONE HYDROCHLORIDE 2 MG/ML
0.5 INJECTION INTRAMUSCULAR; INTRAVENOUS; SUBCUTANEOUS ONCE
Qty: 0 | Refills: 0 | Status: DISCONTINUED | OUTPATIENT
Start: 2017-06-13 | End: 2017-06-13

## 2017-06-14 RX ORDER — AMIODARONE HYDROCHLORIDE 400 MG/1
200 TABLET ORAL DAILY
Qty: 0 | Refills: 0 | Status: DISCONTINUED | OUTPATIENT
Start: 2017-06-14 | End: 2017-06-14

## 2017-06-14 RX ORDER — AMIODARONE HYDROCHLORIDE 400 MG/1
200 TABLET ORAL DAILY
Qty: 0 | Refills: 0 | Status: DISCONTINUED | OUTPATIENT
Start: 2017-06-14 | End: 2017-06-17

## 2017-06-14 RX ORDER — SIMETHICONE 80 MG/1
80 TABLET, CHEWABLE ORAL ONCE
Qty: 0 | Refills: 0 | Status: COMPLETED | OUTPATIENT
Start: 2017-06-14 | End: 2017-06-14

## 2017-06-14 RX ORDER — METOCLOPRAMIDE HCL 10 MG
10 TABLET ORAL EVERY 8 HOURS
Qty: 0 | Refills: 0 | Status: COMPLETED | OUTPATIENT
Start: 2017-06-14 | End: 2017-06-14

## 2017-06-14 RX ORDER — INSULIN LISPRO 100/ML
VIAL (ML) SUBCUTANEOUS
Qty: 0 | Refills: 0 | Status: DISCONTINUED | OUTPATIENT
Start: 2017-06-14 | End: 2017-06-17

## 2017-06-14 RX ORDER — DEXTROSE 50 % IN WATER 50 %
25 SYRINGE (ML) INTRAVENOUS ONCE
Qty: 0 | Refills: 0 | Status: DISCONTINUED | OUTPATIENT
Start: 2017-06-14 | End: 2017-06-17

## 2017-06-14 RX ORDER — DEXTROSE 50 % IN WATER 50 %
12.5 SYRINGE (ML) INTRAVENOUS ONCE
Qty: 0 | Refills: 0 | Status: DISCONTINUED | OUTPATIENT
Start: 2017-06-14 | End: 2017-06-17

## 2017-06-14 RX ORDER — ONDANSETRON 8 MG/1
4 TABLET, FILM COATED ORAL EVERY 6 HOURS
Qty: 0 | Refills: 0 | Status: DISCONTINUED | OUTPATIENT
Start: 2017-06-14 | End: 2017-06-17

## 2017-06-14 RX ORDER — ONDANSETRON 8 MG/1
4 TABLET, FILM COATED ORAL ONCE
Qty: 0 | Refills: 0 | Status: COMPLETED | OUTPATIENT
Start: 2017-06-13 | End: 2017-06-13

## 2017-06-14 RX ORDER — CALCIUM CARBONATE 500(1250)
1 TABLET ORAL
Qty: 0 | Refills: 0 | Status: DISCONTINUED | OUTPATIENT
Start: 2017-06-14 | End: 2017-06-17

## 2017-06-14 RX ORDER — POTASSIUM CHLORIDE 20 MEQ
20 PACKET (EA) ORAL DAILY
Qty: 0 | Refills: 0 | Status: DISCONTINUED | OUTPATIENT
Start: 2017-06-14 | End: 2017-06-17

## 2017-06-14 RX ORDER — SODIUM CHLORIDE 9 MG/ML
1000 INJECTION, SOLUTION INTRAVENOUS
Qty: 0 | Refills: 0 | Status: DISCONTINUED | OUTPATIENT
Start: 2017-06-14 | End: 2017-06-14

## 2017-06-14 RX ORDER — ONDANSETRON 8 MG/1
4 TABLET, FILM COATED ORAL EVERY 6 HOURS
Qty: 0 | Refills: 0 | Status: DISCONTINUED | OUTPATIENT
Start: 2017-06-14 | End: 2017-06-14

## 2017-06-14 RX ORDER — DEXTROSE 50 % IN WATER 50 %
1 SYRINGE (ML) INTRAVENOUS ONCE
Qty: 0 | Refills: 0 | Status: DISCONTINUED | OUTPATIENT
Start: 2017-06-14 | End: 2017-06-14

## 2017-06-14 RX ORDER — ONDANSETRON 8 MG/1
4 TABLET, FILM COATED ORAL ONCE
Qty: 0 | Refills: 0 | Status: COMPLETED | OUTPATIENT
Start: 2017-06-14 | End: 2017-06-14

## 2017-06-14 RX ORDER — FUROSEMIDE 40 MG
20 TABLET ORAL ONCE
Qty: 0 | Refills: 0 | Status: DISCONTINUED | OUTPATIENT
Start: 2017-06-14 | End: 2017-06-14

## 2017-06-14 RX ORDER — GLUCAGON INJECTION, SOLUTION 0.5 MG/.1ML
1 INJECTION, SOLUTION SUBCUTANEOUS ONCE
Qty: 0 | Refills: 0 | Status: DISCONTINUED | OUTPATIENT
Start: 2017-06-14 | End: 2017-06-14

## 2017-06-14 RX ORDER — SODIUM CHLORIDE 9 MG/ML
3 INJECTION INTRAMUSCULAR; INTRAVENOUS; SUBCUTANEOUS EVERY 8 HOURS
Qty: 0 | Refills: 0 | Status: DISCONTINUED | OUTPATIENT
Start: 2017-06-14 | End: 2017-06-17

## 2017-06-14 RX ADMIN — Medication 3 MILLILITER(S): at 20:28

## 2017-06-14 RX ADMIN — SIMETHICONE 80 MILLIGRAM(S): 80 TABLET, CHEWABLE ORAL at 10:20

## 2017-06-14 RX ADMIN — Medication 325 MILLIGRAM(S): at 13:18

## 2017-06-14 RX ADMIN — SODIUM CHLORIDE 3 MILLILITER(S): 9 INJECTION INTRAMUSCULAR; INTRAVENOUS; SUBCUTANEOUS at 12:58

## 2017-06-14 RX ADMIN — ONDANSETRON 4 MILLIGRAM(S): 8 TABLET, FILM COATED ORAL at 07:52

## 2017-06-14 RX ADMIN — Medication 20 MILLIEQUIVALENT(S): at 13:18

## 2017-06-14 RX ADMIN — Medication 150 MILLIGRAM(S): at 09:28

## 2017-06-14 RX ADMIN — Medication 3 MILLILITER(S): at 15:43

## 2017-06-14 RX ADMIN — Medication 10 MILLIGRAM(S): at 13:17

## 2017-06-14 RX ADMIN — PANTOPRAZOLE SODIUM 40 MILLIGRAM(S): 20 TABLET, DELAYED RELEASE ORAL at 06:15

## 2017-06-14 RX ADMIN — SODIUM CHLORIDE 3 MILLILITER(S): 9 INJECTION INTRAMUSCULAR; INTRAVENOUS; SUBCUTANEOUS at 21:15

## 2017-06-14 RX ADMIN — Medication 100 MILLIGRAM(S): at 13:20

## 2017-06-14 RX ADMIN — Medication 20 MILLIGRAM(S): at 06:15

## 2017-06-14 RX ADMIN — Medication 3 MILLILITER(S): at 08:30

## 2017-06-14 RX ADMIN — Medication 100 MILLIGRAM(S): at 06:12

## 2017-06-14 RX ADMIN — Medication 10 MILLIGRAM(S): at 22:51

## 2017-06-14 RX ADMIN — Medication 3 MILLILITER(S): at 03:17

## 2017-06-14 NOTE — PROGRESS NOTE ADULT - SUBJECTIVE AND OBJECTIVE BOX
71y Female s/p Mitral valve repair PFO (patent foramen ovale), primary closure      Subjective: patient has no acute complaints    T(C): 36.7, Max: 37.7 (06-13 @ 13:00)  HR: 76 (71 - 84)  BP: 100/62 (94/53 - 134/60)  RR: 36 (12 - 36)  SpO2: 97% (83% - 100%)  CVP(mm Hg): 11 (7 - 96)        06-14    139  |  105  |  18.0  ----------------------------<  114  4.4   |  22.0  |  0.57    Ca    7.9<L>      14 Jun 2017 05:08  Phos  3.2     06-13  Mg     2.5     06-14    TPro  5.0<L>  /  Alb  3.3  /  TBili  0.7  /  DBili  x   /  AST  58<H>  /  ALT  56<H>  /  AlkPhos  41  06-13                               10.4   14.5  )-----------( 75       ( 14 Jun 2017 05:08 )             31.0        PT/INR - ( 13 Jun 2017 14:35 )   PT: 13.2 sec;   INR: 1.20 ratio         PTT - ( 12 Jun 2017 12:21 )  PTT:31.1 sec  ABG - ( 12 Jun 2017 21:26 )  pH: 7.34  /  pCO2: 40    /  pO2: 161   / HCO3: 21    / Base Excess: -3.8  /  SaO2: 99                         CAPILLARY BLOOD GLUCOSE  112 (14 Jun 2017 07:00)  112 (14 Jun 2017 06:00)  114 (14 Jun 2017 04:00)  112 (14 Jun 2017 02:00)  100 (14 Jun 2017 00:00)  106 (13 Jun 2017 22:00)  100 (13 Jun 2017 20:00)  99 (13 Jun 2017 18:00)  109 (13 Jun 2017 17:00)  117 (13 Jun 2017 16:00)  100 (13 Jun 2017 14:00)  103 (13 Jun 2017 13:00)  102 (13 Jun 2017 12:00)  105 (13 Jun 2017 11:00)  111 (13 Jun 2017 10:00)  114 (13 Jun 2017 09:30)  105 (13 Jun 2017 08:30)           CXR:   Status post open heart surgery.   Residual right and left lung base  opacities as described  Right IJ sheath catheter tip in SVC.    I&O's Detail    I & Os for current day (as of 14 Jun 2017 08:12)  =============================================  IN:    Oral Fluid: 960 ml    Solution: 150 ml    sodium chloride 0.9%: 120 ml    sodium chloride 0.9%: 80 ml    Solution: 50 ml    Total IN: 1360 ml  ---------------------------------------------  OUT:    Voided: 1275 ml    Indwelling Catheter - Urethral: 805 ml    Chest Tube: 50 ml    Total OUT: 2130 ml  ---------------------------------------------  Total NET: -770 ml      MEDICATIONS  (STANDING):  docusate sodium 100milliGRAM(s) Oral three times a day  aspirin enteric coated 325milliGRAM(s) Oral daily  vancomycin  IVPB 750milliGRAM(s) IV Intermittent every 12 hours  pantoprazole    Tablet 40milliGRAM(s) Oral before breakfast  ALBUTerol/ipratropium for Nebulization 3milliLiter(s) Nebulizer every 6 hours  ALBUTerol    90 MICROgram(s) HFA Inhaler 1Puff(s) Inhalation every 4 hours  tiotropium 18 MICROgram(s) Capsule 1Capsule(s) Inhalation daily  furosemide    Tablet 20milliGRAM(s) Oral daily  potassium chloride    Tablet ER 20milliEquivalent(s) Oral daily  enoxaparin Injectable 40milliGRAM(s) SubCutaneous daily  amiodarone    Tablet 200milliGRAM(s) Oral every 12 hours  insulin lispro (HumaLOG) corrective regimen sliding scale  SubCutaneous Before meals and at bedtime      MEDICATIONS  (PRN):  ketorolac   Injectable 15milliGRAM(s) IV Push every 6 hours PRN pain  oxyCODONE  5 mG/acetaminophen 325 mG 1Tablet(s) Oral every 4 hours PRN Mild Pain (1 - 3)  oxyCODONE  5 mG/acetaminophen 325 mG 2Tablet(s) Oral every 4 hours PRN Severe Pain (7 - 10)  dextrose Gel 1Dose(s) Oral once PRN Blood Glucose LESS THAN 70 milliGRAM(s)/deciLiter  glucagon  Injectable 1milliGRAM(s) IntraMuscular once PRN Glucose <70 milliGRAM(s)/deciLiter      Physical Exam  Neuro: A+O x 3, non-focal, speech clear and intact  Pulm: CTA, equal bilaterally  CV: RRR, +S1S2  Abd: soft, NT, ND,   Ext: MIGUEL x 4, no edema  Inc: MSI C/D/I/stable w/ dsg,

## 2017-06-14 NOTE — PROGRESS NOTE ADULT - SUBJECTIVE AND OBJECTIVE BOX
CARDIOLOGY PROGRESS NOTE   ([facility])                                                                                                        Subjective: feels better, she still ha nausea although improved after, denied CP, dyspnea, Comfortable    Vitals:  T(C): 37.1, Max: 37.7 (06-13 @ 13:00)  HR: 98 (71 - 98)  BP: 92/61 (92/61 - 134/60)  RR: 31 (12 - 36)  SpO2: 97% (83% - 100%)  Wt(kg): --  I&O's Summary    I & Os for current day (as of 14 Jun 2017 09:42)  =============================================  IN: 1360 ml / OUT: 2130 ml / NET: -770 ml        PHYSICAL EXAM:  Appearance: Normal	  HEENT:   Atraumatic  Cardiovascular: Normal S1 S2, No JVD  Respiratory: Lungs clear to auscultation	  Gastrointestinal:  Soft, Non-tender, + BS	  Skin: No rashes, No ecchymoses, No cyanosis  Neurologic: Alert and awake, able to move extremities  Extremities: No edema    TELEMETRY: 	    ECG:  	SR            CURRENT MEDICATIONS:  furosemide    Tablet 20milliGRAM(s) Oral daily      ALBUTerol/ipratropium for Nebulization 3milliLiter(s) Nebulizer every 6 hours  ALBUTerol    90 MICROgram(s) HFA Inhaler 1Puff(s) Inhalation every 4 hours  tiotropium 18 MICROgram(s) Capsule 1Capsule(s) Inhalation daily    ketorolac   Injectable 15milliGRAM(s) IV Push every 6 hours PRN  oxyCODONE  5 mG/acetaminophen 325 mG 1Tablet(s) Oral every 4 hours PRN  metoclopramide Injectable 10milliGRAM(s) IV Push every 8 hours    docusate sodium 100milliGRAM(s) Oral three times a day  pantoprazole    Tablet 40milliGRAM(s) Oral before breakfast  calcium carbonate 500 mG (Tums) Chewable 1Tablet(s) Chew two times a day PRN  simethicone 80milliGRAM(s) Chew once    insulin lispro (HumaLOG) corrective regimen sliding scale  SubCutaneous Before meals and at bedtime  dextrose 50% Injectable 12.5Gram(s) IV Push once  dextrose 50% Injectable 25Gram(s) IV Push once  dextrose 50% Injectable 25Gram(s) IV Push once    aspirin enteric coated 325milliGRAM(s) Oral daily  potassium chloride    Tablet ER 20milliEquivalent(s) Oral daily  enoxaparin Injectable 40milliGRAM(s) SubCutaneous daily      LABS:	 	                        10.4   14.5  )-----------( 75       ( 14 Jun 2017 05:08 )             31.0     06-14    139  |  105  |  18.0  ----------------------------<  114  4.4   |  22.0  |  0.57    Ca    7.9<L>      14 Jun 2017 05:08  Phos  3.2     06-13  Mg     2.5     06-14    TPro  5.0<L>  /  Alb  3.3  /  TBili  0.7  /  DBili  x   /  AST  58<H>  /  ALT  56<H>  /  AlkPhos  41  06-13

## 2017-06-15 DIAGNOSIS — E87.70 FLUID OVERLOAD, UNSPECIFIED: ICD-10-CM

## 2017-06-15 DIAGNOSIS — I48.0 PAROXYSMAL ATRIAL FIBRILLATION: ICD-10-CM

## 2017-06-15 DIAGNOSIS — D69.59 OTHER SECONDARY THROMBOCYTOPENIA: ICD-10-CM

## 2017-06-15 LAB
ALBUMIN SERPL ELPH-MCNC: 3.3 G/DL — SIGNIFICANT CHANGE UP (ref 3.3–5.2)
ALBUMIN SERPL ELPH-MCNC: 3.3 G/DL — SIGNIFICANT CHANGE UP (ref 3.3–5.2)
ALP SERPL-CCNC: 65 U/L — SIGNIFICANT CHANGE UP (ref 40–120)
ALP SERPL-CCNC: 65 U/L — SIGNIFICANT CHANGE UP (ref 40–120)
ALT FLD-CCNC: 43 U/L — HIGH
ALT FLD-CCNC: 43 U/L — HIGH
ANION GAP SERPL CALC-SCNC: 15 MMOL/L — SIGNIFICANT CHANGE UP (ref 5–17)
AST SERPL-CCNC: 47 U/L — HIGH
AST SERPL-CCNC: 47 U/L — HIGH
BILIRUB DIRECT SERPL-MCNC: 0.1 MG/DL — SIGNIFICANT CHANGE UP (ref 0–0.3)
BILIRUB INDIRECT FLD-MCNC: 0.4 MG/DL — SIGNIFICANT CHANGE UP (ref 0.2–1)
BILIRUB SERPL-MCNC: 0.5 MG/DL — SIGNIFICANT CHANGE UP (ref 0.4–2)
BILIRUB SERPL-MCNC: 0.5 MG/DL — SIGNIFICANT CHANGE UP (ref 0.4–2)
BUN SERPL-MCNC: 17 MG/DL — SIGNIFICANT CHANGE UP (ref 8–20)
CALCIUM SERPL-MCNC: 8.2 MG/DL — LOW (ref 8.6–10.2)
CHLORIDE SERPL-SCNC: 106 MMOL/L — SIGNIFICANT CHANGE UP (ref 98–107)
CO2 SERPL-SCNC: 22 MMOL/L — SIGNIFICANT CHANGE UP (ref 22–29)
CREAT SERPL-MCNC: 0.55 MG/DL — SIGNIFICANT CHANGE UP (ref 0.5–1.3)
GLUCOSE SERPL-MCNC: 102 MG/DL — SIGNIFICANT CHANGE UP (ref 70–115)
HCT VFR BLD CALC: 30.5 % — LOW (ref 37–47)
HGB BLD-MCNC: 10.1 G/DL — LOW (ref 12–16)
MCHC RBC-ENTMCNC: 30.2 PG — SIGNIFICANT CHANGE UP (ref 27–31)
MCHC RBC-ENTMCNC: 33.1 G/DL — SIGNIFICANT CHANGE UP (ref 32–36)
MCV RBC AUTO: 91.3 FL — SIGNIFICANT CHANGE UP (ref 81–99)
PLATELET # BLD AUTO: 84 K/UL — LOW (ref 150–400)
POTASSIUM SERPL-MCNC: 4.7 MMOL/L — SIGNIFICANT CHANGE UP (ref 3.5–5.3)
POTASSIUM SERPL-SCNC: 4.7 MMOL/L — SIGNIFICANT CHANGE UP (ref 3.5–5.3)
PROT SERPL-MCNC: 5.3 G/DL — LOW (ref 6.6–8.7)
PROT SERPL-MCNC: 5.3 G/DL — LOW (ref 6.6–8.7)
RBC # BLD: 3.34 M/UL — LOW (ref 4.4–5.2)
RBC # FLD: 14.4 % — SIGNIFICANT CHANGE UP (ref 11–15.6)
SODIUM SERPL-SCNC: 143 MMOL/L — SIGNIFICANT CHANGE UP (ref 135–145)
SURGICAL PATHOLOGY FINAL REPORT - CH: SIGNIFICANT CHANGE UP
WBC # BLD: 11.5 K/UL — HIGH (ref 4.8–10.8)
WBC # FLD AUTO: 11.5 K/UL — HIGH (ref 4.8–10.8)

## 2017-06-15 PROCEDURE — 99232 SBSQ HOSP IP/OBS MODERATE 35: CPT

## 2017-06-15 PROCEDURE — 93010 ELECTROCARDIOGRAM REPORT: CPT

## 2017-06-15 PROCEDURE — 71010: CPT | Mod: 26

## 2017-06-15 RX ORDER — PSYLLIUM SEED (WITH DEXTROSE)
1 POWDER (GRAM) ORAL
Qty: 0 | Refills: 0 | Status: DISCONTINUED | OUTPATIENT
Start: 2017-06-15 | End: 2017-06-17

## 2017-06-15 RX ORDER — FUROSEMIDE 40 MG
40 TABLET ORAL DAILY
Qty: 0 | Refills: 0 | Status: DISCONTINUED | OUTPATIENT
Start: 2017-06-16 | End: 2017-06-17

## 2017-06-15 RX ORDER — FUROSEMIDE 40 MG
20 TABLET ORAL ONCE
Qty: 0 | Refills: 0 | Status: COMPLETED | OUTPATIENT
Start: 2017-06-15 | End: 2017-06-15

## 2017-06-15 RX ADMIN — SODIUM CHLORIDE 3 MILLILITER(S): 9 INJECTION INTRAMUSCULAR; INTRAVENOUS; SUBCUTANEOUS at 13:38

## 2017-06-15 RX ADMIN — Medication 3 MILLILITER(S): at 15:58

## 2017-06-15 RX ADMIN — Medication 325 MILLIGRAM(S): at 11:40

## 2017-06-15 RX ADMIN — Medication 20 MILLIEQUIVALENT(S): at 16:57

## 2017-06-15 RX ADMIN — SODIUM CHLORIDE 3 MILLILITER(S): 9 INJECTION INTRAMUSCULAR; INTRAVENOUS; SUBCUTANEOUS at 21:07

## 2017-06-15 RX ADMIN — Medication 3 MILLILITER(S): at 09:59

## 2017-06-15 RX ADMIN — SODIUM CHLORIDE 3 MILLILITER(S): 9 INJECTION INTRAMUSCULAR; INTRAVENOUS; SUBCUTANEOUS at 05:06

## 2017-06-15 RX ADMIN — AMIODARONE HYDROCHLORIDE 200 MILLIGRAM(S): 400 TABLET ORAL at 05:58

## 2017-06-15 RX ADMIN — Medication 20 MILLIGRAM(S): at 08:43

## 2017-06-15 RX ADMIN — PANTOPRAZOLE SODIUM 40 MILLIGRAM(S): 20 TABLET, DELAYED RELEASE ORAL at 05:58

## 2017-06-15 RX ADMIN — Medication 20 MILLIGRAM(S): at 05:58

## 2017-06-15 NOTE — PROGRESS NOTE ADULT - SUBJECTIVE AND OBJECTIVE BOX
CARDIOLOGY PROGRESS NOTE   ([facility])                                                                                                        Subjective: Comfortable, NAD, denied dyspnea, orthopnea, PND, CP, palpitation, ambulated felt well  Nausea resolved    Vitals:  T(C): 36.6, Max: 36.8 (06-14 @ 12:14)  HR: 81 (81 - 99)  BP: 122/68 (90/50 - 122/68)  RR: 16 (14 - 16)  SpO2: 97% (95% - 100%)  Wt(kg): --  I&O's Summary  I & Os for 24h ending 15 David 2017 07:00  =============================================  IN: 1100 ml / OUT: 702 ml / NET: 398 ml    I & Os for current day (as of 15 David 2017 11:17)  =============================================  IN: 240 ml / OUT: 0 ml / NET: 240 ml        PHYSICAL EXAM:  Appearance: Normal	  HEENT:   Atraumatic  Cardiovascular: Normal S1 S2, No JVD, No murmurs, No edema  Respiratory: Lungs clear to auscultation	  Gastrointestinal:  Soft, Non-tender, + BS	  Skin: No rashes, No ecchymoses, No cyanosis  Neurologic: Alert and awake, able to move extremities  Extremities: No edema        CURRENT MEDICATIONS:  amiodarone    Tablet 200milliGRAM(s) Oral daily      ALBUTerol/ipratropium for Nebulization 3milliLiter(s) Nebulizer every 6 hours  ALBUTerol    90 MICROgram(s) HFA Inhaler 1Puff(s) Inhalation every 4 hours  tiotropium 18 MICROgram(s) Capsule 1Capsule(s) Inhalation daily    oxyCODONE  5 mG/acetaminophen 325 mG 1Tablet(s) Oral every 4 hours PRN  ondansetron Injectable 4milliGRAM(s) IV Push every 6 hours PRN    docusate sodium 100milliGRAM(s) Oral three times a day  pantoprazole    Tablet 40milliGRAM(s) Oral before breakfast  calcium carbonate 500 mG (Tums) Chewable 1Tablet(s) Chew two times a day PRN  psyllium Powder 1Packet(s) Oral two times a day    insulin lispro (HumaLOG) corrective regimen sliding scale  SubCutaneous Before meals and at bedtime  dextrose 50% Injectable 12.5Gram(s) IV Push once  dextrose 50% Injectable 25Gram(s) IV Push once  dextrose 50% Injectable 25Gram(s) IV Push once    aspirin enteric coated 325milliGRAM(s) Oral daily  potassium chloride    Tablet ER 20milliEquivalent(s) Oral daily      LABS:	 	                         10.1   11.5  )-----------( 84       ( 15 David 2017 05:46 )             30.5     06-15    143  |  106  |  17.0  ----------------------------<  102  4.7   |  22.0  |  0.55    Ca    8.2<L>      15 David 2017 05:46  Phos  3.2     06-13  Mg     2.5     06-14    TPro  5.3<L>  /  Alb  3.3  /  TBili  0.5  /  DBili  0.1  /  AST  47<H>  /  ALT  43<H>  /  AlkPhos  65  06-15

## 2017-06-15 NOTE — PROGRESS NOTE ADULT - SUBJECTIVE AND OBJECTIVE BOX
Subjective: ""I feel much better... I've been walking around all morning." Pt currently lying in bed, NAD.     VITAL SIGNS  Vital Signs Last 24 Hrs  T(C): 36.6, Max: 36.8 (06-14 @ 12:14)  T(F): 97.8, Max: 98.3 (06-14 @ 22:50)  HR: 81 (81 - 99)  BP: 122/68 (90/50 - 122/68)  RR: 16 (14 - 16)  SpO2: 97% (95% - 100%) RA    Telemetry/Alarms:  SR 80s with PVCs  LVEF: 65%    MEDICATIONS  docusate sodium 100milliGRAM(s) Oral three times a day  aspirin enteric coated 325milliGRAM(s) Oral daily  pantoprazole    Tablet 40milliGRAM(s) Oral before breakfast  ALBUTerol/ipratropium for Nebulization 3milliLiter(s) Nebulizer every 6 hours  ALBUTerol    90 MICROgram(s) HFA Inhaler 1Puff(s) Inhalation every 4 hours  tiotropium 18 MICROgram(s) Capsule 1Capsule(s) Inhalation daily  oxyCODONE  5 mG/acetaminophen 325 mG 1Tablet(s) Oral every 4 hours PRN  insulin lispro (HumaLOG) corrective regimen sliding scale  SubCutaneous Before meals and at bedtime  dextrose 50% Injectable 12.5Gram(s) IV Push once  dextrose 50% Injectable 25Gram(s) IV Push once  dextrose 50% Injectable 25Gram(s) IV Push once  sodium chloride 0.9% lock flush 3milliLiter(s) IV Push every 8 hours  calcium carbonate 500 mG (Tums) Chewable 1Tablet(s) Chew two times a day PRN  potassium chloride    Tablet ER 20milliEquivalent(s) Oral daily  amiodarone    Tablet 200milliGRAM(s) Oral daily  ondansetron Injectable 4milliGRAM(s) IV Push every 6 hours PRN  psyllium Powder 1Packet(s) Oral two times a day      PHYSICAL EXAM  General: well nourished, well developed, no acute distress  Neurology: alert and oriented x 3, nonfocal, no gross deficits  Respiratory: diminished bilateral bases  CV: regular rate and rhythm, normal S1, S2  Abdomen: soft, nontender, mildly distended, positive bowel sounds, last bowel movement overnight x 2  Extremities: warm, well perfused. no edema. + DP pulses  Incisions: midline sternal incision, + mepilex, c/d/i. sternum stable.  Epicardial Wires: +PW   > isolated      I & Os for current day (as of 06-15 @ 10:47)  =============================================  IN: 1100 ml / OUT: 702 ml / NET: 398 ml      Weights:  Daily     Daily Weight in k.9 (15 David 2017 04:29)  Admit Wt: Drug Dosing Weight  Height (cm): 152.4 (2017 05:47)  Weight (kg): 54 (2017 05:47)  BMI (kg/m2): 23.3 (2017 05:47)  BSA (m2): 1.5 (2017 05:47)    LABS  06-15    143  |  106  |  17.0  ----------------------------<  102  4.7   |  22.0  |  0.55    Ca    8.2<L>      15 David 2017 05:46  Phos  3.2     06-13  Mg     2.5     06-14    TPro  5.3<L>  /  Alb  3.3  /  TBili  0.5  /  DBili  0.1  /  AST  47<H>  /  ALT  43<H>  /  AlkPhos  65  06-15                                 10.1   11.5  )-----------( x        ( 15 David 2017 05:46 )             30.5          PT/INR - ( 2017 14:35 )   PT: 13.2 sec;   INR: 1.20 ratio           Bilirubin Total, Serum: 0.5 mg/dL (06-15 @ 05:46)  Bilirubin Direct, Serum: 0.1 mg/dL (06-15 @ 05:46)  Bilirubin Total, Serum: 0.5 mg/dL (06-15 @ 05:46)    CAPILLARY BLOOD GLUCOSE  118 (2017 22:50)  109 (2017 17:55)  120 (2017 11:00)           Today's CXR: 6/15 Status post open heart surgery.   Residual bilateral mild pleural effusions and  right lung base  lower   lobe opacification.    PAST MEDICAL & SURGICAL HISTORY:  Pure hypercholesterolemia  Mild intermittent reactive airway disease without complication  Systolic murmur  Aortic regurgitation: moderate  Tricuspid regurgitation: moderate  Aortic valve insufficiency  Mitral regurgitation: severe  Pulmonary hypertension: 49-54 mmHG moderate PH  Cortical cataract of both eyes  Posterior vitreous detachment of both eyes  Psoriasis  Hyperlipidemia: diet controlled  Asthma  History of tonsillectomy  No significant past surgical history

## 2017-06-16 ENCOUNTER — TRANSCRIPTION ENCOUNTER (OUTPATIENT)
Age: 72
End: 2017-06-16

## 2017-06-16 LAB
ALBUMIN SERPL ELPH-MCNC: 3.3 G/DL — SIGNIFICANT CHANGE UP (ref 3.3–5.2)
ALP SERPL-CCNC: 66 U/L — SIGNIFICANT CHANGE UP (ref 40–120)
ALT FLD-CCNC: 45 U/L — HIGH
AMYLASE P1 CFR SERPL: 49 U/L — SIGNIFICANT CHANGE UP (ref 36–128)
ANION GAP SERPL CALC-SCNC: 12 MMOL/L — SIGNIFICANT CHANGE UP (ref 5–17)
AST SERPL-CCNC: 42 U/L — HIGH
BILIRUB SERPL-MCNC: 0.4 MG/DL — SIGNIFICANT CHANGE UP (ref 0.4–2)
BUN SERPL-MCNC: 15 MG/DL — SIGNIFICANT CHANGE UP (ref 8–20)
CALCIUM SERPL-MCNC: 8.2 MG/DL — LOW (ref 8.6–10.2)
CHLORIDE SERPL-SCNC: 107 MMOL/L — SIGNIFICANT CHANGE UP (ref 98–107)
CO2 SERPL-SCNC: 23 MMOL/L — SIGNIFICANT CHANGE UP (ref 22–29)
CREAT SERPL-MCNC: 0.56 MG/DL — SIGNIFICANT CHANGE UP (ref 0.5–1.3)
GLUCOSE SERPL-MCNC: 90 MG/DL — SIGNIFICANT CHANGE UP (ref 70–115)
HCT VFR BLD CALC: 29.7 % — LOW (ref 37–47)
HGB BLD-MCNC: 9.9 G/DL — LOW (ref 12–16)
LIDOCAIN IGE QN: 36 U/L — SIGNIFICANT CHANGE UP (ref 22–51)
MAGNESIUM SERPL-MCNC: 2 MG/DL — SIGNIFICANT CHANGE UP (ref 1.6–2.6)
MCHC RBC-ENTMCNC: 30.5 PG — SIGNIFICANT CHANGE UP (ref 27–31)
MCHC RBC-ENTMCNC: 33.3 G/DL — SIGNIFICANT CHANGE UP (ref 32–36)
MCV RBC AUTO: 91.4 FL — SIGNIFICANT CHANGE UP (ref 81–99)
PHOSPHATE SERPL-MCNC: 1.9 MG/DL — LOW (ref 2.4–4.7)
PLATELET # BLD AUTO: 120 K/UL — LOW (ref 150–400)
POTASSIUM SERPL-MCNC: 3.9 MMOL/L — SIGNIFICANT CHANGE UP (ref 3.5–5.3)
POTASSIUM SERPL-SCNC: 3.9 MMOL/L — SIGNIFICANT CHANGE UP (ref 3.5–5.3)
PROT SERPL-MCNC: 5.3 G/DL — LOW (ref 6.6–8.7)
RBC # BLD: 3.25 M/UL — LOW (ref 4.4–5.2)
RBC # FLD: 14.2 % — SIGNIFICANT CHANGE UP (ref 11–15.6)
SODIUM SERPL-SCNC: 142 MMOL/L — SIGNIFICANT CHANGE UP (ref 135–145)
WBC # BLD: 8.9 K/UL — SIGNIFICANT CHANGE UP (ref 4.8–10.8)
WBC # FLD AUTO: 8.9 K/UL — SIGNIFICANT CHANGE UP (ref 4.8–10.8)

## 2017-06-16 PROCEDURE — 99232 SBSQ HOSP IP/OBS MODERATE 35: CPT

## 2017-06-16 PROCEDURE — 71010: CPT | Mod: 26

## 2017-06-16 RX ORDER — ENOXAPARIN SODIUM 100 MG/ML
40 INJECTION SUBCUTANEOUS DAILY
Qty: 0 | Refills: 0 | Status: DISCONTINUED | OUTPATIENT
Start: 2017-06-16 | End: 2017-06-17

## 2017-06-16 RX ADMIN — Medication 63.75 MILLIMOLE(S): at 11:47

## 2017-06-16 RX ADMIN — PANTOPRAZOLE SODIUM 40 MILLIGRAM(S): 20 TABLET, DELAYED RELEASE ORAL at 06:05

## 2017-06-16 RX ADMIN — Medication 325 MILLIGRAM(S): at 11:47

## 2017-06-16 RX ADMIN — SODIUM CHLORIDE 3 MILLILITER(S): 9 INJECTION INTRAMUSCULAR; INTRAVENOUS; SUBCUTANEOUS at 11:58

## 2017-06-16 RX ADMIN — Medication 20 MILLIEQUIVALENT(S): at 11:47

## 2017-06-16 RX ADMIN — Medication 40 MILLIGRAM(S): at 06:05

## 2017-06-16 RX ADMIN — SODIUM CHLORIDE 3 MILLILITER(S): 9 INJECTION INTRAMUSCULAR; INTRAVENOUS; SUBCUTANEOUS at 21:49

## 2017-06-16 RX ADMIN — AMIODARONE HYDROCHLORIDE 200 MILLIGRAM(S): 400 TABLET ORAL at 06:05

## 2017-06-16 RX ADMIN — SODIUM CHLORIDE 3 MILLILITER(S): 9 INJECTION INTRAMUSCULAR; INTRAVENOUS; SUBCUTANEOUS at 06:04

## 2017-06-16 NOTE — DISCHARGE NOTE ADULT - HOSPITAL COURSE
6/12 Complex MV repair,  # 26 ring  / PFO closure   2 prbc’s     Same day admit for planned MV surgery for severe. 150 amio given in OR empirically MR. 6/12 amio ordered then d/c’d due to JR vs long 1st degree on EKG (never given postop) 6/13 ectopy w/ burst of VT vs Afib w/abberancy amio load started 6/12 Complex MV repair,  # 26 ring  / PFO closure   2 prbc’s     Same day admit for planned MV surgery for severe. 150 amio given in OR empirically MR. 6/12 amio ordered then d/c’d due to JR vs long 1st degree on EKG (never given postop) 6/13 ectopy w/ burst of VT vs Afib w/abberancy amio load started    Amiodarone stopped on 6/17 for first degree HB

## 2017-06-16 NOTE — PROGRESS NOTE ADULT - SUBJECTIVE AND OBJECTIVE BOX
Subjective: "I feel much better today."  Reports that Nausea has resolved  Pt in chair NAD    VITAL SIGNS  T(C): 36.8, Max: 36.9 (06-15 @ 15:00)  HR: 84 (78 - 90)  BP: 108/60 (108/60 - 128/68)  RR: 16 (16 - 16)  SpO2: 99% (97% - 100%) RA           Daily     Daily Weight in k.7 (2017 05:00)  Admit Wt: Drug Dosing Weight  Height (cm): 152.4 (2017 05:47)  Weight (kg): 54 (2017 05:47)    Telemetry:   SR 1st degree   LVEF: 65%    MEDICATIONS  docusate sodium 100milliGRAM(s) Oral three times a day  aspirin enteric coated 325milliGRAM(s) Oral daily  pantoprazole    Tablet 40milliGRAM(s) Oral before breakfast  oxyCODONE  5 mG/acetaminophen 325 mG 1Tablet(s) Oral every 4 hours PRN  insulin lispro (HumaLOG) corrective regimen sliding scale  SubCutaneous Before meals and at bedtime  dextrose 50% Injectable 12.5Gram(s) IV Push once  dextrose 50% Injectable 25Gram(s) IV Push once  dextrose 50% Injectable 25Gram(s) IV Push once  sodium chloride 0.9% lock flush 3milliLiter(s) IV Push every 8 hours  calcium carbonate 500 mG (Tums) Chewable 1Tablet(s) Chew two times a day PRN  potassium chloride    Tablet ER 20milliEquivalent(s) Oral daily  amiodarone    Tablet 200milliGRAM(s) Oral daily  ondansetron Injectable 4milliGRAM(s) IV Push every 6 hours PRN  psyllium Powder 1Packet(s) Oral two times a day  furosemide    Tablet 40milliGRAM(s) Oral daily  sodium phosphate IVPB 15milliMole(s) IV Intermittent once  enoxaparin Injectable 40milliGRAM(s) SubCutaneous daily    MEDICATIONS  (PRN):  oxyCODONE  5 mG/acetaminophen 325 mG 1Tablet(s) Oral every 4 hours PRN Mild Pain (1 - 3)  calcium carbonate 500 mG (Tums) Chewable 1Tablet(s) Chew two times a day PRN Heartburn  ondansetron Injectable 4milliGRAM(s) IV Push every 6 hours PRN Nausea and/or Vomiting        PHYSICAL EXAM   General: well nourished, well developed, no acute distress  Neurology: alert and oriented x 3, nonfocal, no gross deficits  Respiratory: diminished bilateral bases  CV: regular rate and rhythm, normal S1, S2  Abdomen: soft, nontender, mildly distended, positive bowel sounds, + BM   Extremities: warm, well perfused. no edema. + DP pulses  Incisions: midline sternal incision, + mepilex, c/d/i. sternum stable.  Epicardial Wires: +PW   > isolated        I&O's Detail    I & Os for current day (as of 2017 10:43)  =============================================  IN:    Oral Fluid: 1380 ml    Total IN: 1380 ml  ---------------------------------------------  OUT:    Voided: 400 ml    Total OUT: 400 ml  ---------------------------------------------  Total NET: 980 ml      LABS      142  |  107  |  15.0  ----------------------------<  90  3.9   |  23.0  |  0.56    Ca    8.2<L>      2017 05:54  Phos  1.9       Mg     2.0         TPro  5.3<L>  /  Alb  3.3  /  TBili  0.4  /  DBili  x   /  AST  42<H>  /  ALT  45<H>  /  AlkPhos  66                                   9.9    8.9   )-----------( 120      ( 2017 05:54 )             29.7                LIVER FUNCTIONS - ( 2017 05:54 )  Alb: 3.3 g/dL / Pro: 5.3 g/dL / ALK PHOS: 66 U/L / ALT: 45 U/L / AST: 42 U/L / GGT: x              CAPILLARY BLOOD GLUCOSE  93 (2017 08:02)  113 (15 David 2017 21:07)  103 (15 David 2017 16:44)           Today's CXR: b/l PVC     PAST MEDICAL & SURGICAL HISTORY:  Pure hypercholesterolemia  Mild intermittent reactive airway disease without complication  Systolic murmur  Aortic regurgitation: moderate  Tricuspid regurgitation: moderate  Aortic valve insufficiency  Mitral regurgitation: severe  Pulmonary hypertension: 49-54 mmHG moderate PH  Cortical cataract of both eyes  Posterior vitreous detachment of both eyes  Psoriasis  Hyperlipidemia: diet controlled  Asthma  History of tonsillectomy  No significant past surgical history

## 2017-06-16 NOTE — DISCHARGE NOTE ADULT - NS AS ACTIVITY OBS
Showering allowed/Walking-Indoors allowed/Stairs allowed/Do not drive or operate machinery/Walking-Outdoors allowed/No Heavy lifting/straining

## 2017-06-16 NOTE — DISCHARGE NOTE ADULT - CARE PLAN
Principal Discharge DX:	Non-rheumatic mitral regurgitation  Goal:	recover from surgery  Instructions for follow-up, activity and diet:	Please come to ED or Call Cardio thoracic office at [phone] if Chest pain, Shortness of Breath,  Nausea & vomiting, oozing from wounds, 2 lb increase in weight in 24 hours.   1. Shower daily  2. Cleanse Midsternal incision and leg incision daily while showering with warm water and mild soap, pat dry and maintain open to air.   3. Weight yourself daily in the morning, record it on the weight log and notify the cardiac surgeon of any weight gain greater than 2-3 pounds in 24 hours.  4. Regular diet - low fat, low cholesterol, no added salt.  5. Continue breathing exercises several times a day.  6. No driving until cleared by MD.   7. No heavy lifting nothing greater than 5 pounds until cleared by MD.   8. Call / Notify MD any fever greater than 101.0  9. Increase Activity as tolerated.   No Ointments creams lotions to wounds ]  No Swimming  Secondary Diagnosis:	PFO (patent foramen ovale) Principal Discharge DX:	Non-rheumatic mitral regurgitation  Goal:	recover from surgery  Instructions for follow-up, activity and diet:	Please follow up with Dr. Champagne in one to two weeks. Call the office Monday for an appointment.  Please follow up with your Cardiologist and Primary Care Physician 2-4 weeks from discharge.  1. Take ALL of your medications as ordered. Fill your prescriptions the day you are discharged and take according to the schedule you were given. Continue to take a stool softener if you are taking narcotic pain medications. AVOID medications such as ibuprofen or naproxen if you have had bypass surgery. If you have any questions or are unable to fill the prescriptions, please call the office right away at [phone].  2. Shower daily. Clean all incisions daily while showering with warm water and mild soap, pat dry with a clean towel and do not cover with any dressings unless instructed to. No bathing, swimming in a pool or the ocean until instructed by MD.  DO NOT use creams or lotions on the wound.  3. We advise that you do not drive until instructed by MD.   4. You may not return to work until instructed by MD.   5. Please eat a low fat, low cholesterol, low salt diet. (No added/extra salt)  6. Weigh yourself every day in the morning and record it in the weight log in your red folder. Notify the office of any weight gain more than 2-3 pounds in 24 hours.  **Please LIMIT your fluid intake to less than a liter a day.**  7. Continue breathing exercises several times a day. Continue to use your heart pillow.  8. No heavy lifting nothing greater than 5 pounds until cleared by MD.   9. Call / Notify MD any fever greater than 101.0, any drainage from incisions or if they become red, hot or very tender to the touch.  10. Increase activity as tolerated. Walk indoors and/or outdoors at least 3 times a day.  11. Keep surgical or sports bra on 24 hours a day for at least 2 weeks.  Secondary Diagnosis:	PFO (patent foramen ovale) Principal Discharge DX:	Non-rheumatic mitral regurgitation  Goal:	recover from surgery  Instructions for follow-up, activity and diet:	Please follow up with Dr. Champagne in one to two weeks. Call the office Monday for an appointment.  Please follow up with your Cardiologist and Primary Care Physician 2-4 weeks from discharge.  1. Take ALL of your medications as ordered. Fill your prescriptions the day you are discharged and take according to the schedule you were given. Continue to take a stool softener if you are taking narcotic pain medications. AVOID medications such as ibuprofen or naproxen if you have had bypass surgery. If you have any questions or are unable to fill the prescriptions, please call the office right away at [phone].  2. Shower daily. Clean all incisions daily while showering with warm water and mild soap, pat dry with a clean towel and do not cover with any dressings unless instructed to. No bathing, swimming in a pool or the ocean until instructed by MD.  DO NOT use creams or lotions on the wound.  3. We advise that you do not drive until instructed by MD.   4. You may not return to work until instructed by MD.   5. Please eat a low fat, low cholesterol, low salt diet. (No added/extra salt)  6. Weigh yourself every day in the morning and record it in the weight log in your red folder. Notify the office of any weight gain more than 2-3 pounds in 24 hours.  **Please LIMIT your fluid intake to less than a liter a day.**  7. Continue breathing exercises several times a day. Continue to use your heart pillow.  8. No heavy lifting nothing greater than 5 pounds until cleared by MD.   9. Call / Notify MD any fever greater than 101.0, any drainage from incisions or if they become red, hot or very tender to the touch.  10. Increase activity as tolerated. Walk indoors and/or outdoors at least 3 times a day.  11. Keep surgical or sports bra on 24 hours a day for at least 2 weeks.  Secondary Diagnosis:	PFO (patent foramen ovale)

## 2017-06-16 NOTE — DISCHARGE NOTE ADULT - PROVIDER TOKENS
FREE:[LAST:[Ihsan],FIRST:[David],PHONE:[phone]],FAX:[(   )    -],ADDRESS:[[address] [place] [address]]] FREE:[LAST:[Ihsan],FIRST:[David],PHONE:[phone]],FAX:[(   )    -],ADDRESS:[[address] [place] [address]]],TOKEN:'[id number]'

## 2017-06-16 NOTE — DISCHARGE NOTE ADULT - CARE PROVIDER_API CALL
David Champagne  [address] [place] [address]  Phone: [phone]  Fax: (   )    - David Champagne  [address] [place] [address]  Phone: [phone]  Fax: (   )    -    Ryan Schuler), Cardiovascular Disease  [address] [place] [address]  Phone: [phone]  Fax: [phone]

## 2017-06-16 NOTE — DISCHARGE NOTE ADULT - MEDICATION SUMMARY - MEDICATIONS TO TAKE
I will START or STAY ON the medications listed below when I get home from the hospital:    acetaminophen-oxycodone 325 mg-5 mg oral tablet  -- 1 tab(s) by mouth every 4 hours, As needed, Mild Pain (1 - 3) MDD:6  -- Indication: For Pain    aspirin 325 mg oral delayed release tablet  -- 1 tab(s) by mouth once a day  -- Indication: For Mitral valve    furosemide 20 mg oral tablet  -- 1 tab(s) by mouth once a day  -- Avoid prolonged or excessive exposure to direct and/or artificial sunlight while taking this medication.  It is very important that you take or use this exactly as directed.  Do not skip doses or discontinue unless directed by your doctor.  It may be advisable to drink a full glass orange juice or eat a banana daily while taking this medication.    -- Indication: For fluid overload    docusate sodium 100 mg oral capsule  -- 1 cap(s) by mouth 3 times a day  -- Indication: For consipation    Klor-Con 10 mEq oral tablet, extended release  -- 1 tab(s) by mouth once a day  -- Indication: For Potassium    pantoprazole 40 mg oral delayed release tablet  -- 1 tab(s) by mouth once a day (before a meal)  -- Indication: For acid reflux    Calcium 600+D oral tablet  -- 2 tab(s) by mouth once a day  -- Indication: For calcium

## 2017-06-16 NOTE — PROGRESS NOTE ADULT - SUBJECTIVE AND OBJECTIVE BOX
CARDIOLOGY PROGRESS NOTE   ([facility])                                                                                                        Subjective: feels well, sitting in bed, comfortable, denied, dyspnea, CP, nausea    Vitals:  T(C): 36.8, Max: 36.9 (06-15 @ 15:00)  HR: 84 (72 - 90)  BP: 108/60 (108/60 - 128/68)  RR: 16 (16 - 18)  SpO2: 99% (97% - 100%)  Wt(kg): --  I&O's Summary    I & Os for current day (as of 16 Jun 2017 08:25)  =============================================  IN: 1380 ml / OUT: 400 ml / NET: 980 ml        PHYSICAL EXAM:  Appearance: Normal	  HEENT:   Atraumatic  Cardiovascular: Normal S1 S2, No JVD, 1/6 SM at apex, No edema  Respiratory: Lungs clear to auscultation	  Gastrointestinal:  Soft, Non-tender, + BS	  Skin: No rashes, No ecchymoses, No cyanosis  Neurologic: Alert and awake, able to move extremities  Extremities: No edema    TELEMETRY: 	  Sr 1st degree AVB, 3 beats NSVT          CURRENT MEDICATIONS:  amiodarone    Tablet 200milliGRAM(s) Oral daily  furosemide    Tablet 40milliGRAM(s) Oral daily        oxyCODONE  5 mG/acetaminophen 325 mG 1Tablet(s) Oral every 4 hours PRN  ondansetron Injectable 4milliGRAM(s) IV Push every 6 hours PRN    docusate sodium 100milliGRAM(s) Oral three times a day  pantoprazole    Tablet 40milliGRAM(s) Oral before breakfast  calcium carbonate 500 mG (Tums) Chewable 1Tablet(s) Chew two times a day PRN  psyllium Powder 1Packet(s) Oral two times a day    insulin lispro (HumaLOG) corrective regimen sliding scale  SubCutaneous Before meals and at bedtime  dextrose 50% Injectable 12.5Gram(s) IV Push once  dextrose 50% Injectable 25Gram(s) IV Push once  dextrose 50% Injectable 25Gram(s) IV Push once    aspirin enteric coated 325milliGRAM(s) Oral daily  potassium chloride    Tablet ER 20milliEquivalent(s) Oral daily      LABS:	 	    CARDIAC MARKERS:                        9.9    8.9   )-----------( 120      ( 16 Jun 2017 05:54 )             29.7     06-16    142  |  107  |  15.0  ----------------------------<  90  3.9   |  23.0  |  0.56    Ca    8.2<L>      16 Jun 2017 05:54  Phos  1.9     06-16  Mg     2.0     06-16    TPro  5.3<L>  /  Alb  3.3  /  TBili  0.4  /  DBili  x   /  AST  42<H>  /  ALT  45<H>  /  AlkPhos  66  06-16

## 2017-06-16 NOTE — DISCHARGE NOTE ADULT - CARE PROVIDERS DIRECT ADDRESSES
,DirectAddress_Unknown ,DirectAddress_Unknown,[email or web address]

## 2017-06-16 NOTE — DISCHARGE NOTE ADULT - PLAN OF CARE
recover from surgery Please come to ED or Call Cardio thoracic office at [phone] if Chest pain, Shortness of Breath,  Nausea & vomiting, oozing from wounds, 2 lb increase in weight in 24 hours.   1. Shower daily  2. Cleanse Midsternal incision and leg incision daily while showering with warm water and mild soap, pat dry and maintain open to air.   3. Weight yourself daily in the morning, record it on the weight log and notify the cardiac surgeon of any weight gain greater than 2-3 pounds in 24 hours.  4. Regular diet - low fat, low cholesterol, no added salt.  5. Continue breathing exercises several times a day.  6. No driving until cleared by MD.   7. No heavy lifting nothing greater than 5 pounds until cleared by MD.   8. Call / Notify MD any fever greater than 101.0  9. Increase Activity as tolerated.   No Ointments creams lotions to wounds ]  No Swimming Please follow up with Dr. Champagne in one to two weeks. Call the office Monday for an appointment.  Please follow up with your Cardiologist and Primary Care Physician 2-4 weeks from discharge.  1. Take ALL of your medications as ordered. Fill your prescriptions the day you are discharged and take according to the schedule you were given. Continue to take a stool softener if you are taking narcotic pain medications. AVOID medications such as ibuprofen or naproxen if you have had bypass surgery. If you have any questions or are unable to fill the prescriptions, please call the office right away at [phone].  2. Shower daily. Clean all incisions daily while showering with warm water and mild soap, pat dry with a clean towel and do not cover with any dressings unless instructed to. No bathing, swimming in a pool or the ocean until instructed by MD.  DO NOT use creams or lotions on the wound.  3. We advise that you do not drive until instructed by MD.   4. You may not return to work until instructed by MD.   5. Please eat a low fat, low cholesterol, low salt diet. (No added/extra salt)  6. Weigh yourself every day in the morning and record it in the weight log in your red folder. Notify the office of any weight gain more than 2-3 pounds in 24 hours.  **Please LIMIT your fluid intake to less than a liter a day.**  7. Continue breathing exercises several times a day. Continue to use your heart pillow.  8. No heavy lifting nothing greater than 5 pounds until cleared by MD.   9. Call / Notify MD any fever greater than 101.0, any drainage from incisions or if they become red, hot or very tender to the touch.  10. Increase activity as tolerated. Walk indoors and/or outdoors at least 3 times a day.  11. Keep surgical or sports bra on 24 hours a day for at least 2 weeks.

## 2017-06-16 NOTE — DISCHARGE NOTE ADULT - ADDITIONAL INSTRUCTIONS
Follow up appointment with Dr Champagne in 7-10 days after discharge    Cardiac surgery office second floor at [facility] Please call the Cardiothoracic Surgery office at [phone] if you are experiencing any shortness of breath, chest pain, fevers or chills, drainage from the incisions, persistent nausea, vomiting or if you have any questions about your medications. If the symptoms are severe, call 911 and go to the nearest hospital.

## 2017-06-16 NOTE — DISCHARGE NOTE ADULT - PATIENT PORTAL LINK FT
“You can access the [employer] Patient Portal, offered by [facility], by registering with the following website: [email or web address]”

## 2017-06-17 VITALS
DIASTOLIC BLOOD PRESSURE: 68 MMHG | RESPIRATION RATE: 18 BRPM | OXYGEN SATURATION: 97 % | TEMPERATURE: 98 F | HEART RATE: 88 BPM | SYSTOLIC BLOOD PRESSURE: 124 MMHG

## 2017-06-17 LAB
ANION GAP SERPL CALC-SCNC: 12 MMOL/L — SIGNIFICANT CHANGE UP (ref 5–17)
BUN SERPL-MCNC: 17 MG/DL — SIGNIFICANT CHANGE UP (ref 8–20)
CALCIUM SERPL-MCNC: 8.5 MG/DL — LOW (ref 8.6–10.2)
CHLORIDE SERPL-SCNC: 108 MMOL/L — HIGH (ref 98–107)
CO2 SERPL-SCNC: 24 MMOL/L — SIGNIFICANT CHANGE UP (ref 22–29)
CREAT SERPL-MCNC: 0.51 MG/DL — SIGNIFICANT CHANGE UP (ref 0.5–1.3)
GLUCOSE SERPL-MCNC: 95 MG/DL — SIGNIFICANT CHANGE UP (ref 70–115)
HCT VFR BLD CALC: 31.3 % — LOW (ref 37–47)
HGB BLD-MCNC: 10.4 G/DL — LOW (ref 12–16)
MAGNESIUM SERPL-MCNC: 2 MG/DL — SIGNIFICANT CHANGE UP (ref 1.8–2.6)
MCHC RBC-ENTMCNC: 30.7 PG — SIGNIFICANT CHANGE UP (ref 27–31)
MCHC RBC-ENTMCNC: 33.2 G/DL — SIGNIFICANT CHANGE UP (ref 32–36)
MCV RBC AUTO: 92.3 FL — SIGNIFICANT CHANGE UP (ref 81–99)
PHOSPHATE SERPL-MCNC: 3 MG/DL — SIGNIFICANT CHANGE UP (ref 2.4–4.7)
PLATELET # BLD AUTO: 160 K/UL — SIGNIFICANT CHANGE UP (ref 150–400)
POTASSIUM SERPL-MCNC: 4 MMOL/L — SIGNIFICANT CHANGE UP (ref 3.5–5.3)
POTASSIUM SERPL-SCNC: 4 MMOL/L — SIGNIFICANT CHANGE UP (ref 3.5–5.3)
RBC # BLD: 3.39 M/UL — LOW (ref 4.4–5.2)
RBC # FLD: 14.2 % — SIGNIFICANT CHANGE UP (ref 11–15.6)
SODIUM SERPL-SCNC: 144 MMOL/L — SIGNIFICANT CHANGE UP (ref 135–145)
WBC # BLD: 9 K/UL — SIGNIFICANT CHANGE UP (ref 4.8–10.8)
WBC # FLD AUTO: 9 K/UL — SIGNIFICANT CHANGE UP (ref 4.8–10.8)

## 2017-06-17 PROCEDURE — 80053 COMPREHEN METABOLIC PANEL: CPT

## 2017-06-17 PROCEDURE — 80048 BASIC METABOLIC PNL TOTAL CA: CPT

## 2017-06-17 PROCEDURE — 80076 HEPATIC FUNCTION PANEL: CPT

## 2017-06-17 PROCEDURE — 83690 ASSAY OF LIPASE: CPT

## 2017-06-17 PROCEDURE — 88305 TISSUE EXAM BY PATHOLOGIST: CPT

## 2017-06-17 PROCEDURE — 85730 THROMBOPLASTIN TIME PARTIAL: CPT

## 2017-06-17 PROCEDURE — 93325 DOPPLER ECHO COLOR FLOW MAPG: CPT

## 2017-06-17 PROCEDURE — 82150 ASSAY OF AMYLASE: CPT

## 2017-06-17 PROCEDURE — 94002 VENT MGMT INPAT INIT DAY: CPT

## 2017-06-17 PROCEDURE — 94640 AIRWAY INHALATION TREATMENT: CPT

## 2017-06-17 PROCEDURE — P9045: CPT

## 2017-06-17 PROCEDURE — 85014 HEMATOCRIT: CPT

## 2017-06-17 PROCEDURE — 86901 BLOOD TYPING SEROLOGIC RH(D): CPT

## 2017-06-17 PROCEDURE — 82330 ASSAY OF CALCIUM: CPT

## 2017-06-17 PROCEDURE — 82803 BLOOD GASES ANY COMBINATION: CPT

## 2017-06-17 PROCEDURE — 82435 ASSAY OF BLOOD CHLORIDE: CPT

## 2017-06-17 PROCEDURE — 84132 ASSAY OF SERUM POTASSIUM: CPT

## 2017-06-17 PROCEDURE — 93005 ELECTROCARDIOGRAM TRACING: CPT

## 2017-06-17 PROCEDURE — 97116 GAIT TRAINING THERAPY: CPT

## 2017-06-17 PROCEDURE — 74000: CPT

## 2017-06-17 PROCEDURE — 93320 DOPPLER ECHO COMPLETE: CPT

## 2017-06-17 PROCEDURE — 84100 ASSAY OF PHOSPHORUS: CPT

## 2017-06-17 PROCEDURE — 86900 BLOOD TYPING SEROLOGIC ABO: CPT

## 2017-06-17 PROCEDURE — 36415 COLL VENOUS BLD VENIPUNCTURE: CPT

## 2017-06-17 PROCEDURE — 83735 ASSAY OF MAGNESIUM: CPT

## 2017-06-17 PROCEDURE — 99231 SBSQ HOSP IP/OBS SF/LOW 25: CPT

## 2017-06-17 PROCEDURE — 85027 COMPLETE CBC AUTOMATED: CPT

## 2017-06-17 PROCEDURE — 84295 ASSAY OF SERUM SODIUM: CPT

## 2017-06-17 PROCEDURE — 86920 COMPATIBILITY TEST SPIN: CPT

## 2017-06-17 PROCEDURE — 84484 ASSAY OF TROPONIN QUANT: CPT

## 2017-06-17 PROCEDURE — 82553 CREATINE MB FRACTION: CPT

## 2017-06-17 PROCEDURE — 82947 ASSAY GLUCOSE BLOOD QUANT: CPT

## 2017-06-17 PROCEDURE — 93312 ECHO TRANSESOPHAGEAL: CPT

## 2017-06-17 PROCEDURE — 97163 PT EVAL HIGH COMPLEX 45 MIN: CPT

## 2017-06-17 PROCEDURE — C1889: CPT

## 2017-06-17 PROCEDURE — 86850 RBC ANTIBODY SCREEN: CPT

## 2017-06-17 PROCEDURE — 94760 N-INVAS EAR/PLS OXIMETRY 1: CPT

## 2017-06-17 PROCEDURE — 93010 ELECTROCARDIOGRAM REPORT: CPT

## 2017-06-17 PROCEDURE — 71010: CPT | Mod: 26

## 2017-06-17 PROCEDURE — 83605 ASSAY OF LACTIC ACID: CPT

## 2017-06-17 PROCEDURE — P9016: CPT

## 2017-06-17 PROCEDURE — 85610 PROTHROMBIN TIME: CPT

## 2017-06-17 PROCEDURE — 82550 ASSAY OF CK (CPK): CPT

## 2017-06-17 PROCEDURE — 71045 X-RAY EXAM CHEST 1 VIEW: CPT

## 2017-06-17 RX ORDER — OXYCODONE HYDROCHLORIDE 5 MG/1
1 TABLET ORAL
Qty: 30 | Refills: 0 | OUTPATIENT
Start: 2017-06-17 | End: 2017-06-22

## 2017-06-17 RX ORDER — DOCUSATE SODIUM 100 MG
1 CAPSULE ORAL
Qty: 0 | Refills: 0 | COMMUNITY
Start: 2017-06-17

## 2017-06-17 RX ORDER — ASPIRIN/CALCIUM CARB/MAGNESIUM 324 MG
1 TABLET ORAL
Qty: 0 | Refills: 0 | COMMUNITY
Start: 2017-06-17

## 2017-06-17 RX ORDER — POTASSIUM CHLORIDE 20 MEQ
1 PACKET (EA) ORAL
Qty: 14 | Refills: 0 | OUTPATIENT
Start: 2017-06-17 | End: 2017-07-01

## 2017-06-17 RX ORDER — POTASSIUM CHLORIDE 20 MEQ
40 PACKET (EA) ORAL ONCE
Qty: 0 | Refills: 0 | Status: COMPLETED | OUTPATIENT
Start: 2017-06-17 | End: 2017-06-17

## 2017-06-17 RX ORDER — PANTOPRAZOLE SODIUM 20 MG/1
1 TABLET, DELAYED RELEASE ORAL
Qty: 14 | Refills: 0 | OUTPATIENT
Start: 2017-06-17 | End: 2017-07-01

## 2017-06-17 RX ORDER — FUROSEMIDE 40 MG
1 TABLET ORAL
Qty: 14 | Refills: 0 | OUTPATIENT
Start: 2017-06-17 | End: 2017-07-01

## 2017-06-17 RX ADMIN — SODIUM CHLORIDE 3 MILLILITER(S): 9 INJECTION INTRAMUSCULAR; INTRAVENOUS; SUBCUTANEOUS at 06:05

## 2017-06-17 RX ADMIN — Medication 40 MILLIGRAM(S): at 06:06

## 2017-06-17 RX ADMIN — AMIODARONE HYDROCHLORIDE 200 MILLIGRAM(S): 400 TABLET ORAL at 06:06

## 2017-06-17 RX ADMIN — Medication 40 MILLIEQUIVALENT(S): at 11:54

## 2017-06-17 RX ADMIN — Medication 325 MILLIGRAM(S): at 11:55

## 2017-06-17 RX ADMIN — PANTOPRAZOLE SODIUM 40 MILLIGRAM(S): 20 TABLET, DELAYED RELEASE ORAL at 08:53

## 2017-06-17 NOTE — PROGRESS NOTE ADULT - PROBLEM SELECTOR PROBLEM 10
Hypervolemia, unspecified hypervolemia type

## 2017-06-17 NOTE — PROGRESS NOTE ADULT - PROBLEM SELECTOR PROBLEM 7
Junctional rhythm
Thrombocytopenia due to extra corporeal by-pass circulation
Junctional rhythm

## 2017-06-17 NOTE — PROGRESS NOTE ADULT - PROBLEM SELECTOR PLAN 3
Spirivia, Duoneb and Albuterol  monitor resp status
Spirivia, Duoneb and Albuterol started   monitor
Spirivia, Duoneb and Albuterol started   monitor

## 2017-06-17 NOTE — PROGRESS NOTE ADULT - PROBLEM SELECTOR PLAN 1
s/p MV repair,  continue ASA
s/p MV repair, currently HD stable off of pressor support  change amiodarone to lopressor  continue ASA  continue ICU care  d/w team in AM
s/p MV repair, currently HD stable off of pressor support  consider lopressor (betablocker in AM )   continue ASA  continue ICU care  d/w team in AM

## 2017-06-17 NOTE — PROGRESS NOTE ADULT - PROBLEM SELECTOR PLAN 7
Improved   restart Lovenox today for DVTP
Improved   restart Lovenox today for DVTP
Improving (called lab - should be 84K today)
resolved  continue to monitor   amio restarted for ectopy (with PVC vs afib), caused nausea, should consider changing to low dose lopressor as tolerated.
resolved  continue to monitor   amio restarted for ectopy (with PVC vs afib)

## 2017-06-17 NOTE — PROGRESS NOTE ADULT - PROBLEM SELECTOR PLAN 8
On Amiodarone once daily  d/c PW today
no further AF > d/c Amio for 1st degree AV Block
On Amiodarone once daily  Keep PW until tomorrow per Dr Champagne

## 2017-06-17 NOTE — PROGRESS NOTE ADULT - PROBLEM SELECTOR PLAN 10
cont lasix   Possible discharge to home on Saturday  D/W Dr Champagne in AM rounds.
cont lasix > dec dose
increase diuretics   Normal systolic function, likely not heart failure, although if mild pulmonary edema does not improve, may warrant repeat echocardiogram    Possible discharge to home on Saturday  D/W Dr Champagne in AM rounds.

## 2017-06-17 NOTE — PROGRESS NOTE ADULT - SUBJECTIVE AND OBJECTIVE BOX
Patient discussed on morning rounds with Dr. Champagne    Operation: 6/12/17 Mitral valve repair  PFO (patent foramen ovale), primary closure    Surgeon: Ihsan    Referring Physician: Ganga    SUBJECTIVE ASSESSMENT: "I feel great!"    Vital Signs Last 24 Hrs  T(C): 36.7, Max: 37.3 (06-16 @ 15:01)  T(F): 98, Max: 99.2 (06-16 @ 15:58)  HR: 93 (78 - 93)  BP: 120/60 (100/60 - 138/72)  BP(mean): --  RR: 18 (15 - 18)  SpO2: 100% (96% - 100%) ra    I&O's Detail    I & Os for current day (as of 17 Jun 2017 09:44)  =============================================  IN:    Oral Fluid: 480 ml    Total IN: 480 ml  ---------------------------------------------  OUT:    Voided: 700 ml    Total OUT: 700 ml  ---------------------------------------------  Total NET: -220 ml      PHYSICAL EXAM:  General: WN/WD NAD  Neurology: A&Ox3, nonfocal, MIGUEL x 4  Respiratory: diminished right base  CV: RRR, S1S2, no murmurs, rubs or gallops, slight tachy  Abdominal: Soft, NT, ND +BS, Last BM  Extremities: No edema, + peripheral pulses  Incisions: MSI Healing Well, Sternum Stable, + staples/silks    LABS:                        10.4   9.0   )-----------( 160      ( 17 Jun 2017 06:17 )             31.3       06-17    144  |  108<H>  |  17.0  ----------------------------<  95  4.0   |  24.0  |  0.51    Ca    8.5<L>      17 Jun 2017 06:17  Phos  3.0     06-17  Mg     2.0     06-17    TPro  5.3<L>  /  Alb  3.3  /  TBili  0.4  /  DBili  x   /  AST  42<H>  /  ALT  45<H>  /  AlkPhos  66  06-16      ALLERGIES: Allergy Status Unknown  [facility] Pt (Unknown)    MEDICATIONS  (STANDING):  docusate sodium 100milliGRAM(s) Oral three times a day  aspirin enteric coated 325milliGRAM(s) Oral daily  pantoprazole    Tablet 40milliGRAM(s) Oral before breakfast  insulin lispro (HumaLOG) corrective regimen sliding scale  SubCutaneous Before meals and at bedtime  dextrose 50% Injectable 12.5Gram(s) IV Push once  dextrose 50% Injectable 25Gram(s) IV Push once  dextrose 50% Injectable 25Gram(s) IV Push once  sodium chloride 0.9% lock flush 3milliLiter(s) IV Push every 8 hours  potassium chloride    Tablet ER 20milliEquivalent(s) Oral daily  amiodarone    Tablet 200milliGRAM(s) Oral daily  psyllium Powder 1Packet(s) Oral two times a day  furosemide    Tablet 40milliGRAM(s) Oral daily  enoxaparin Injectable 40milliGRAM(s) SubCutaneous daily  potassium chloride    Tablet ER 40milliEquivalent(s) Oral once      Discharge CXR: R atel/eff    Discharge EKG: P    PAST MEDICAL & SURGICAL HISTORY:  Pure hypercholesterolemia  Mild intermittent reactive airway disease without complication  Systolic murmur  Aortic regurgitation: moderate  Tricuspid regurgitation: moderate  Aortic valve insufficiency  Mitral regurgitation: severe  Pulmonary hypertension: 49-54 mmHG moderate PH  Cortical cataract of both eyes  Posterior vitreous detachment of both eyes  Psoriasis  Hyperlipidemia: diet controlled  Asthma  History of tonsillectomy  No significant past surgical history

## 2017-06-17 NOTE — PROGRESS NOTE ADULT - PROBLEM SELECTOR PLAN 2
s/p closure   as above
s/p closure   continue ICU care   as above
s/p closure   continue ICU care   as above

## 2017-06-17 NOTE — PROGRESS NOTE ADULT - PROBLEM SELECTOR PROBLEM 4
Hyperlipidemia, unspecified hyperlipidemia type

## 2017-06-17 NOTE — PROGRESS NOTE ADULT - PROBLEM SELECTOR PROBLEM 3
Mild persistent asthma without complication

## 2017-06-17 NOTE — PROGRESS NOTE ADULT - PROBLEM SELECTOR PROBLEM 1
Nonrheumatic mitral (valve) insufficiency

## 2017-06-17 NOTE — PROGRESS NOTE ADULT - PROBLEM SELECTOR PROBLEM 2
PFO (patent foramen ovale)

## 2017-06-17 NOTE — PROGRESS NOTE ADULT - PROBLEM SELECTOR PLAN 4
Dash diet   start statin   patient education
Dash diet   started statin   patient education
Dash diet   start statin   patient education

## 2017-06-17 NOTE — PROGRESS NOTE ADULT - ASSESSMENT
71 with hx HLD who was found to have severe MR  admitted for elective MV repair surgery, preop cath showed Normal coronaries,  and had surgery now post op Day #2 MV Repair and PFO closure.   She denied CP, dyspnea, orthopnea, PND, edema.   She was started on Amiodarone in OR. amio restarted for ectopy (with PVC vs afib), caused nausea--> change amio to lopressor.   She is currently maintaining SR.  She admitted nausea, Amiodarone is reduced.   She still has nausea today although it is less --> change amiodarone to lopressor      Mild intermittent reactive airway disease without complication: Stable    Pulmonary hypertension: 49-54 mmHG moderate PH  Hyperlipidemia: diet controlled
71 with hx HLD who was found to have severe MR  admitted for elective MV repair surgery, preop cath showed Normal coronaries,  and had surgery on 6/12/2017 MV Repair and PFO closure.     Post op course: AF with aberrancy vs NSVT -->Amiodarone was started.  loading was d/c'ed b/o nausea. nausea resolved now. Amio changed to 200mg daily.    1) Nonrheumatic mitral (valve) insufficiency.   s/p MV repair,  continue ASA.       2) PFO (patent foramen ovale).  Plan: s/p closure       3) Mild asthma without complication.    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   Follow CBC, Check Platelets      5) Pleural Effusion: mild  Normal systolic function,  CXR: 6/15 Status post open heart surgery. Residual bilateral mild pleural effusions and  right lung base  lower lobe opacification.  Ct Diuretics    Possible discharge to home on Saturday as per CT surgery.
71 with hx HLD who was found to have severe MR  admitted for elective MV repair surgery, preop cath showed Normal coronaries,  and had surgery on 6/12/2017 MV Repair and PFO closure.     Post op course: AF with aberrancy vs NSVT -->Amiodarone was started.  loading was d/c'ed b/o nausea. nausea resolved now. Amio changed to 200mg daily.  Telemetry today showed SR. 1st degree AVB, 3 beats NSVT  Asymptomatic, K/Mg checked  Add BB    1) Nonrheumatic mitral (valve) insufficiency.   s/p MV repair,  continue ASA.       2) PFO (patent foramen ovale).  Plan: s/p closure       3) Mild asthma without complication.    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   Platelets 120, improved      5) Pleural Effusion: mild  Normal systolic function,  CXR: 6/15 Status post open heart surgery. Residual bilateral mild pleural effusions and  right lung base  lower lobe opacification.  On Diuretics    Possible discharge to home on Saturday as per CT surgery.
71yFemale with PmHx Pure hypercholesterolemia, , Mild intermittent reactive airway disease without complication, Systolic murmur, Aortic regurgitation: moderate, Tricuspid regurgitation: moderate, Aortic valve insufficiency, Mitral regurgitation: severe, Pulmonary hypertension: 49-54 mmHG moderate PH, Cortical cataract of both eyes, Posterior vitreous detachment of both eyes, Psoriasis, Hyperlipidemia: diet controlled, Asthma, History of tonsillectomy., No significant past surgical history    p/w known Mr through [facility] for valve sx     s/p 6/12/17 MV repair & PFO closure     Post op course complicated by labile blood pressures controlled with resuscitation +/- levo +/- nicardipine, and hyperglycemia controlled with insulin gtt > discontinued with adequate glycemic control, and junctional rhythm > SR 1st deg > AF with abherrancy vs NSVT > amio load d/c'd due to nausea, changed to Amiodarone 200mg daily
71yFemale with PmHx Pure hypercholesterolemia, , Mild intermittent reactive airway disease without complication, Systolic murmur, Aortic regurgitation: moderate, Tricuspid regurgitation: moderate, Aortic valve insufficiency, Mitral regurgitation: severe, Pulmonary hypertension: 49-54 mmHG moderate PH, Cortical cataract of both eyes, Posterior vitreous detachment of both eyes, Psoriasis, Hyperlipidemia: diet controlled, Asthma, History of tonsillectomy., No significant past surgical history    p/w known Mr through [facility] for valve sx     s/p 6/12/17 MV repair & PFO closure     Post op course complicated by labile blood pressures controlled with resuscitation +/- levo +/- nicardipine, and hyperglycemia controlled with insulin gtt > discontinued with adequate glycemic control, and junctional rhythm > SR 1st deg > AF with abherrancy vs NSVT > amio load d/c'd due to nausea, changed to Amiodarone 200mg daily     Plan   d/c home w/ home care today  d/w dr méndez
71yFemale with PmHx Pure hypercholesterolemia, , Mild intermittent reactive airway disease without complication, Systolic murmur, Aortic regurgitation: moderate, Tricuspid regurgitation: moderate, Aortic valve insufficiency, Mitral regurgitation: severe, Pulmonary hypertension: 49-54 mmHG moderate PH, Cortical cataract of both eyes, Posterior vitreous detachment of both eyes, Psoriasis, Hyperlipidemia: diet controlled, Asthma, History of tonsillectomy., No significant past surgical history    p/w known Mr through [facility] for valve sx   currently s/p MV repair & PFO closure   post op course complicated by labile pressures controlled with resuscitation +/- levo +/- nicardipine, and hyperglycemia controlled with insulin gtt, and junctional rhythm in which amio was d/c currently SR with 1st degree)
71yFemale with PmHx Pure hypercholesterolemia, , Mild intermittent reactive airway disease without complication, Systolic murmur, Aortic regurgitation: moderate, Tricuspid regurgitation: moderate, Aortic valve insufficiency, Mitral regurgitation: severe, Pulmonary hypertension: 49-54 mmHG moderate PH, Cortical cataract of both eyes, Posterior vitreous detachment of both eyes, Psoriasis, Hyperlipidemia: diet controlled, Asthma, History of tonsillectomy., No significant past surgical history    p/w known Mr through [facility] for valve sx   currently s/p MV repair & PFO closure   post op course complicated by labile pressures controlled with resuscitation +/- levo +/- nicardipine, and hyperglycemia controlled with insulin gtt, and junctional rhythm in which amio was d/c currently SR with 1st degree)   currently stable off pressure support, and insulin
71yFemale with PmHx Pure hypercholesterolemia, , Mild intermittent reactive airway disease without complication, Systolic murmur, Aortic regurgitation: moderate, Tricuspid regurgitation: moderate, Aortic valve insufficiency, Mitral regurgitation: severe, Pulmonary hypertension: 49-54 mmHG moderate PH, Cortical cataract of both eyes, Posterior vitreous detachment of both eyes, Psoriasis, Hyperlipidemia: diet controlled, Asthma, History of tonsillectomy., No significant past surgical history    p/w known Mr through [facility] for valve sx     s/p 6/12/17 MV repair & PFO closure     Post op course complicated by labile blood pressures controlled with resuscitation +/- levo +/- nicardipine, and hyperglycemia controlled with insulin gtt > discontinued with adequate glycemic control, and junctional rhythm > SR 1st deg > AF with abherrancy vs NSVT > amio load d/c'd due to nausea, changed to Amiodarone 200mg daily started today.

## 2017-07-05 ENCOUNTER — APPOINTMENT (OUTPATIENT)
Dept: CARDIOTHORACIC SURGERY | Facility: CLINIC | Age: 72
End: 2017-07-05

## 2017-07-05 VITALS
OXYGEN SATURATION: 98 % | HEART RATE: 104 BPM | RESPIRATION RATE: 16 BRPM | DIASTOLIC BLOOD PRESSURE: 85 MMHG | HEIGHT: 62 IN | BODY MASS INDEX: 21.71 KG/M2 | SYSTOLIC BLOOD PRESSURE: 143 MMHG | WEIGHT: 118 LBS

## 2018-07-16 PROBLEM — I35.1 NONRHEUMATIC AORTIC (VALVE) INSUFFICIENCY: Chronic | Status: ACTIVE | Noted: 2017-04-24

## 2021-07-20 NOTE — H&P PST ADULT - EYES
Last Appt:  6/7/21  Return:  1 month No show 7/12/21  Next Appt:  7/23/21    EPDMP check:  Lisdexamfetamine Dimesylate 40MG / Capsule  Qty: 30  Days: 30  Refills: 0   Prescribed: 6/7/2021  Dispensed: 6/18/2021  Sold: 6/19/2021      Refill due:7/19/21             EOMI; PERRL; no drainage or redness

## 2022-04-16 NOTE — PROGRESS NOTE ADULT - PROBLEM SELECTOR PLAN 5
[employer] dose 1 and 2
transitioned to AC HS per insulin protocol
transitioned to AC HS per insulin protocol  normoglycemic  A1C 5.7 > d/c fingersticks
continue insulin protocol

## 2023-03-11 NOTE — BRIEF OPERATIVE NOTE - PRE-OP DX
Point of Care Ultrasound Cardiac
Non-rheumatic mitral regurgitation  06/12/2017    Active  Freddie Epstein

## 2023-04-15 NOTE — H&P PST ADULT - MALLAMPATI CLASS
There are no Wet Read(s) to document. Class II - visualization of the soft palate, fauces, and uvula